# Patient Record
Sex: FEMALE | Race: WHITE | NOT HISPANIC OR LATINO | Employment: OTHER | URBAN - METROPOLITAN AREA
[De-identification: names, ages, dates, MRNs, and addresses within clinical notes are randomized per-mention and may not be internally consistent; named-entity substitution may affect disease eponyms.]

---

## 2021-02-22 ENCOUNTER — TRANSCRIBE ORDERS (OUTPATIENT)
Dept: LAB | Facility: HOSPITAL | Age: 26
End: 2021-02-22

## 2021-02-22 ENCOUNTER — LAB (OUTPATIENT)
Dept: LAB | Facility: HOSPITAL | Age: 26
End: 2021-02-22

## 2021-02-22 DIAGNOSIS — Z34.81 PRENATAL CARE, SUBSEQUENT PREGNANCY, FIRST TRIMESTER: ICD-10-CM

## 2021-02-22 DIAGNOSIS — Z34.81 PRENATAL CARE, SUBSEQUENT PREGNANCY, FIRST TRIMESTER: Primary | ICD-10-CM

## 2021-02-22 LAB
25(OH)D3 SERPL-MCNC: 24.4 NG/ML (ref 30–100)
ABO GROUP BLD: NORMAL
BLD GP AB SCN SERPL QL: NEGATIVE
GLUCOSE SERPL-MCNC: 77 MG/DL (ref 65–99)
HBV SURFACE AG SERPL QL IA: NORMAL
HCV AB SER DONR QL: NORMAL
HIV1+2 AB SER QL: NORMAL
RH BLD: POSITIVE
TSH SERPL DL<=0.05 MIU/L-ACNC: 1.61 UIU/ML (ref 0.27–4.2)

## 2021-02-22 PROCEDURE — 80081 OBSTETRIC PANEL INC HIV TSTG: CPT

## 2021-02-22 PROCEDURE — 82306 VITAMIN D 25 HYDROXY: CPT

## 2021-02-22 PROCEDURE — 36415 COLL VENOUS BLD VENIPUNCTURE: CPT

## 2021-02-22 PROCEDURE — 86803 HEPATITIS C AB TEST: CPT

## 2021-02-22 PROCEDURE — 84443 ASSAY THYROID STIM HORMONE: CPT

## 2021-02-22 PROCEDURE — 82947 ASSAY GLUCOSE BLOOD QUANT: CPT

## 2021-02-23 LAB
BASOPHILS # BLD AUTO: 0.04 10*3/MM3 (ref 0–0.2)
BASOPHILS NFR BLD AUTO: 0.5 % (ref 0–1.5)
DEPRECATED RDW RBC AUTO: 42.8 FL (ref 37–54)
EOSINOPHIL # BLD AUTO: 0.06 10*3/MM3 (ref 0–0.4)
EOSINOPHIL NFR BLD AUTO: 0.7 % (ref 0.3–6.2)
ERYTHROCYTE [DISTWIDTH] IN BLOOD BY AUTOMATED COUNT: 13.7 % (ref 12.3–15.4)
HCT VFR BLD AUTO: 34.1 % (ref 34–46.6)
HGB BLD-MCNC: 11.2 G/DL (ref 12–15.9)
IMM GRANULOCYTES # BLD AUTO: 0.05 10*3/MM3 (ref 0–0.05)
IMM GRANULOCYTES NFR BLD AUTO: 0.6 % (ref 0–0.5)
LYMPHOCYTES # BLD AUTO: 1.62 10*3/MM3 (ref 0.7–3.1)
LYMPHOCYTES NFR BLD AUTO: 18.6 % (ref 19.6–45.3)
MCH RBC QN AUTO: 27.8 PG (ref 26.6–33)
MCHC RBC AUTO-ENTMCNC: 32.8 G/DL (ref 31.5–35.7)
MCV RBC AUTO: 84.6 FL (ref 79–97)
MONOCYTES # BLD AUTO: 0.59 10*3/MM3 (ref 0.1–0.9)
MONOCYTES NFR BLD AUTO: 6.8 % (ref 5–12)
NEUTROPHILS NFR BLD AUTO: 6.36 10*3/MM3 (ref 1.7–7)
NEUTROPHILS NFR BLD AUTO: 72.8 % (ref 42.7–76)
NRBC BLD AUTO-RTO: 0 /100 WBC (ref 0–0.2)
PLATELET # BLD AUTO: 223 10*3/MM3 (ref 140–450)
PMV BLD AUTO: 12.3 FL (ref 6–12)
RBC # BLD AUTO: 4.03 10*6/MM3 (ref 3.77–5.28)
RPR SER QL: NORMAL
RUBV IGG SERPL IA-ACNC: POSITIVE
WBC # BLD AUTO: 8.72 10*3/MM3 (ref 3.4–10.8)

## 2021-06-09 ENCOUNTER — TRANSCRIBE ORDERS (OUTPATIENT)
Dept: LAB | Facility: HOSPITAL | Age: 26
End: 2021-06-09

## 2021-06-09 ENCOUNTER — LAB (OUTPATIENT)
Dept: LAB | Facility: HOSPITAL | Age: 26
End: 2021-06-09

## 2021-06-09 DIAGNOSIS — Z34.03 ENCOUNTER FOR SUPERVISION OF NORMAL FIRST PREGNANCY IN THIRD TRIMESTER: ICD-10-CM

## 2021-06-09 DIAGNOSIS — Z34.03 ENCOUNTER FOR SUPERVISION OF NORMAL FIRST PREGNANCY IN THIRD TRIMESTER: Primary | ICD-10-CM

## 2021-06-09 LAB
25(OH)D3 SERPL-MCNC: 29 NG/ML
BASOPHILS # BLD AUTO: 0.04 10*3/MM3 (ref 0–0.2)
BASOPHILS NFR BLD AUTO: 0.4 % (ref 0–1.5)
DEPRECATED RDW RBC AUTO: 36.4 FL (ref 37–54)
EOSINOPHIL # BLD AUTO: 0.08 10*3/MM3 (ref 0–0.4)
EOSINOPHIL NFR BLD AUTO: 0.8 % (ref 0.3–6.2)
ERYTHROCYTE [DISTWIDTH] IN BLOOD BY AUTOMATED COUNT: 12.6 % (ref 12.3–15.4)
EXTERNAL GTT 1 HOUR: 108
GLUCOSE 1H P 100 G GLC PO SERPL-MCNC: 108 MG/DL (ref 65–140)
HCT VFR BLD AUTO: 26.9 % (ref 34–46.6)
HGB BLD-MCNC: 8.7 G/DL (ref 12–15.9)
IMM GRANULOCYTES # BLD AUTO: 0.16 10*3/MM3 (ref 0–0.05)
IMM GRANULOCYTES NFR BLD AUTO: 1.5 % (ref 0–0.5)
LYMPHOCYTES # BLD AUTO: 1.59 10*3/MM3 (ref 0.7–3.1)
LYMPHOCYTES NFR BLD AUTO: 15.2 % (ref 19.6–45.3)
MCH RBC QN AUTO: 26.1 PG (ref 26.6–33)
MCHC RBC AUTO-ENTMCNC: 32.3 G/DL (ref 31.5–35.7)
MCV RBC AUTO: 80.8 FL (ref 79–97)
MONOCYTES # BLD AUTO: 0.76 10*3/MM3 (ref 0.1–0.9)
MONOCYTES NFR BLD AUTO: 7.3 % (ref 5–12)
NEUTROPHILS NFR BLD AUTO: 7.8 10*3/MM3 (ref 1.7–7)
NEUTROPHILS NFR BLD AUTO: 74.8 % (ref 42.7–76)
NRBC BLD AUTO-RTO: 0 /100 WBC (ref 0–0.2)
PLATELET # BLD AUTO: 263 10*3/MM3 (ref 140–450)
PMV BLD AUTO: 11.3 FL (ref 6–12)
RBC # BLD AUTO: 3.33 10*6/MM3 (ref 3.77–5.28)
WBC # BLD AUTO: 10.43 10*3/MM3 (ref 3.4–10.8)

## 2021-06-09 PROCEDURE — 82962 GLUCOSE BLOOD TEST: CPT | Performed by: NURSE PRACTITIONER

## 2021-06-09 PROCEDURE — 82306 VITAMIN D 25 HYDROXY: CPT

## 2021-06-09 PROCEDURE — 85025 COMPLETE CBC W/AUTO DIFF WBC: CPT

## 2021-06-09 PROCEDURE — 36415 COLL VENOUS BLD VENIPUNCTURE: CPT

## 2021-06-09 PROCEDURE — 82950 GLUCOSE TEST: CPT

## 2021-07-07 ENCOUNTER — LAB (OUTPATIENT)
Dept: LAB | Facility: HOSPITAL | Age: 26
End: 2021-07-07

## 2021-07-07 ENCOUNTER — TRANSCRIBE ORDERS (OUTPATIENT)
Dept: LAB | Facility: HOSPITAL | Age: 26
End: 2021-07-07

## 2021-07-07 DIAGNOSIS — O99.011 ANEMIA COMPLICATING PREGNANCY IN FIRST TRIMESTER: ICD-10-CM

## 2021-07-07 DIAGNOSIS — Z34.03 ENCOUNTER FOR SUPERVISION OF NORMAL FIRST PREGNANCY IN THIRD TRIMESTER: Primary | ICD-10-CM

## 2021-07-07 DIAGNOSIS — Z34.03 ENCOUNTER FOR SUPERVISION OF NORMAL FIRST PREGNANCY IN THIRD TRIMESTER: ICD-10-CM

## 2021-07-07 LAB
DEPRECATED RDW RBC AUTO: 50.2 FL (ref 37–54)
ERYTHROCYTE [DISTWIDTH] IN BLOOD BY AUTOMATED COUNT: 16.4 % (ref 12.3–15.4)
FERRITIN SERPL-MCNC: 20.9 NG/ML (ref 13–150)
HCT VFR BLD AUTO: 33.6 % (ref 34–46.6)
HGB BLD-MCNC: 10.7 G/DL (ref 12–15.9)
IRON 24H UR-MRATE: 75 MCG/DL (ref 37–145)
IRON SATN MFR SERPL: 14 % (ref 20–50)
MCH RBC QN AUTO: 26.8 PG (ref 26.6–33)
MCHC RBC AUTO-ENTMCNC: 31.8 G/DL (ref 31.5–35.7)
MCV RBC AUTO: 84 FL (ref 79–97)
PLATELET # BLD AUTO: 221 10*3/MM3 (ref 140–450)
PMV BLD AUTO: 11.7 FL (ref 6–12)
RBC # BLD AUTO: 4 10*6/MM3 (ref 3.77–5.28)
TIBC SERPL-MCNC: 522 MCG/DL (ref 298–536)
TRANSFERRIN SERPL-MCNC: 350 MG/DL (ref 200–360)
WBC # BLD AUTO: 9.59 10*3/MM3 (ref 3.4–10.8)

## 2021-07-07 PROCEDURE — 84466 ASSAY OF TRANSFERRIN: CPT

## 2021-07-07 PROCEDURE — 36415 COLL VENOUS BLD VENIPUNCTURE: CPT

## 2021-07-07 PROCEDURE — 82728 ASSAY OF FERRITIN: CPT

## 2021-07-07 PROCEDURE — 83540 ASSAY OF IRON: CPT

## 2021-07-07 PROCEDURE — 85027 COMPLETE CBC AUTOMATED: CPT

## 2021-08-04 LAB — EXTERNAL GROUP B STREP ANTIGEN: NORMAL

## 2021-08-11 ENCOUNTER — HOSPITAL ENCOUNTER (INPATIENT)
Facility: HOSPITAL | Age: 26
LOS: 3 days | Discharge: HOME | End: 2021-08-14
Attending: OBSTETRICS & GYNECOLOGY | Admitting: OBSTETRICS & GYNECOLOGY

## 2021-08-11 PROBLEM — Z34.90 TERM PREGNANCY: Status: ACTIVE | Noted: 2021-08-11

## 2021-08-11 LAB
ABO GROUP BLD: NORMAL
ALP SERPL-CCNC: 156 U/L (ref 39–117)
ALT SERPL W P-5'-P-CCNC: 8 U/L (ref 1–33)
AST SERPL-CCNC: 13 U/L (ref 1–32)
BILIRUB SERPL-MCNC: <0.2 MG/DL (ref 0–1.2)
BLD GP AB SCN SERPL QL: NEGATIVE
CREAT SERPL-MCNC: 0.51 MG/DL (ref 0.57–1)
DEPRECATED RDW RBC AUTO: 56.4 FL (ref 37–54)
ERYTHROCYTE [DISTWIDTH] IN BLOOD BY AUTOMATED COUNT: 18.5 % (ref 12.3–15.4)
HCT VFR BLD AUTO: 36.4 % (ref 34–46.6)
HGB BLD-MCNC: 12.1 G/DL (ref 12–15.9)
LDH SERPL-CCNC: 136 U/L (ref 135–214)
MCH RBC QN AUTO: 27.9 PG (ref 26.6–33)
MCHC RBC AUTO-ENTMCNC: 33.2 G/DL (ref 31.5–35.7)
MCV RBC AUTO: 84.1 FL (ref 79–97)
PLATELET # BLD AUTO: 184 10*3/MM3 (ref 140–450)
PMV BLD AUTO: 11.6 FL (ref 6–12)
RBC # BLD AUTO: 4.33 10*6/MM3 (ref 3.77–5.28)
RH BLD: POSITIVE
SARS-COV-2 RDRP RESP QL NAA+PROBE: NORMAL
T&S EXPIRATION DATE: NORMAL
URATE SERPL-MCNC: 3.5 MG/DL (ref 2.4–5.7)
WBC # BLD AUTO: 10.21 10*3/MM3 (ref 3.4–10.8)

## 2021-08-11 PROCEDURE — 85027 COMPLETE CBC AUTOMATED: CPT | Performed by: OBSTETRICS & GYNECOLOGY

## 2021-08-11 PROCEDURE — 84450 TRANSFERASE (AST) (SGOT): CPT | Performed by: OBSTETRICS & GYNECOLOGY

## 2021-08-11 PROCEDURE — 86850 RBC ANTIBODY SCREEN: CPT | Performed by: OBSTETRICS & GYNECOLOGY

## 2021-08-11 PROCEDURE — 86901 BLOOD TYPING SEROLOGIC RH(D): CPT | Performed by: OBSTETRICS & GYNECOLOGY

## 2021-08-11 PROCEDURE — 82247 BILIRUBIN TOTAL: CPT | Performed by: OBSTETRICS & GYNECOLOGY

## 2021-08-11 PROCEDURE — 25010000002 BUTORPHANOL PER 1 MG: Performed by: OBSTETRICS & GYNECOLOGY

## 2021-08-11 PROCEDURE — 86900 BLOOD TYPING SEROLOGIC ABO: CPT | Performed by: OBSTETRICS & GYNECOLOGY

## 2021-08-11 PROCEDURE — 84550 ASSAY OF BLOOD/URIC ACID: CPT | Performed by: OBSTETRICS & GYNECOLOGY

## 2021-08-11 PROCEDURE — 82565 ASSAY OF CREATININE: CPT | Performed by: OBSTETRICS & GYNECOLOGY

## 2021-08-11 PROCEDURE — 59025 FETAL NON-STRESS TEST: CPT

## 2021-08-11 PROCEDURE — 83615 LACTATE (LD) (LDH) ENZYME: CPT | Performed by: OBSTETRICS & GYNECOLOGY

## 2021-08-11 PROCEDURE — 84460 ALANINE AMINO (ALT) (SGPT): CPT | Performed by: OBSTETRICS & GYNECOLOGY

## 2021-08-11 PROCEDURE — 3E033VJ INTRODUCTION OF OTHER HORMONE INTO PERIPHERAL VEIN, PERCUTANEOUS APPROACH: ICD-10-PCS | Performed by: OBSTETRICS & GYNECOLOGY

## 2021-08-11 PROCEDURE — 87635 SARS-COV-2 COVID-19 AMP PRB: CPT | Performed by: OBSTETRICS & GYNECOLOGY

## 2021-08-11 PROCEDURE — 59200 INSERT CERVICAL DILATOR: CPT | Performed by: OBSTETRICS & GYNECOLOGY

## 2021-08-11 PROCEDURE — 84075 ASSAY ALKALINE PHOSPHATASE: CPT | Performed by: OBSTETRICS & GYNECOLOGY

## 2021-08-11 RX ORDER — EPHEDRINE SULFATE 5 MG/ML
INJECTION INTRAVENOUS
Status: DISCONTINUED
Start: 2021-08-11 | End: 2021-08-12 | Stop reason: WASHOUT

## 2021-08-11 RX ORDER — BUTORPHANOL TARTRATE 1 MG/ML
1 INJECTION, SOLUTION INTRAMUSCULAR; INTRAVENOUS
Status: DISCONTINUED | OUTPATIENT
Start: 2021-08-11 | End: 2021-08-12 | Stop reason: HOSPADM

## 2021-08-11 RX ORDER — ERYTHROMYCIN 5 MG/G
OINTMENT OPHTHALMIC
Status: DISCONTINUED
Start: 2021-08-11 | End: 2021-08-11

## 2021-08-11 RX ORDER — SODIUM CHLORIDE 0.9 % (FLUSH) 0.9 %
3 SYRINGE (ML) INJECTION EVERY 12 HOURS SCHEDULED
Status: DISCONTINUED | OUTPATIENT
Start: 2021-08-11 | End: 2021-08-12 | Stop reason: HOSPADM

## 2021-08-11 RX ORDER — ONDANSETRON 2 MG/ML
4 INJECTION INTRAMUSCULAR; INTRAVENOUS EVERY 6 HOURS PRN
Status: DISCONTINUED | OUTPATIENT
Start: 2021-08-11 | End: 2021-08-12 | Stop reason: HOSPADM

## 2021-08-11 RX ORDER — MAGNESIUM CARB/ALUMINUM HYDROX 105-160MG
30 TABLET,CHEWABLE ORAL ONCE
Status: DISCONTINUED | OUTPATIENT
Start: 2021-08-11 | End: 2021-08-12 | Stop reason: HOSPADM

## 2021-08-11 RX ORDER — OXYTOCIN-SODIUM CHLORIDE 0.9% IV SOLN 30 UNIT/500ML 30-0.9/5 UT/ML-%
2-20 SOLUTION INTRAVENOUS
Status: DISCONTINUED | OUTPATIENT
Start: 2021-08-12 | End: 2021-08-12

## 2021-08-11 RX ORDER — EPINEPHRINE 0.3 MG/.3ML
INJECTION SUBCUTANEOUS
Status: ON HOLD | COMMUNITY
End: 2021-08-12

## 2021-08-11 RX ORDER — PRENATAL WITH FERROUS FUM AND FOLIC ACID 3080; 920; 120; 400; 22; 1.84; 3; 20; 10; 1; 12; 200; 27; 25; 2 [IU]/1; [IU]/1; MG/1; [IU]/1; MG/1; MG/1; MG/1; MG/1; MG/1; MG/1; UG/1; MG/1; MG/1; MG/1; MG/1
1 TABLET ORAL DAILY
COMMUNITY

## 2021-08-11 RX ORDER — LIDOCAINE HYDROCHLORIDE 10 MG/ML
5 INJECTION, SOLUTION EPIDURAL; INFILTRATION; INTRACAUDAL; PERINEURAL AS NEEDED
Status: DISCONTINUED | OUTPATIENT
Start: 2021-08-11 | End: 2021-08-12 | Stop reason: HOSPADM

## 2021-08-11 RX ORDER — FERROUS SULFATE TAB EC 324 MG (65 MG FE EQUIVALENT) 324 (65 FE) MG
324 TABLET DELAYED RESPONSE ORAL
COMMUNITY

## 2021-08-11 RX ORDER — TERBUTALINE SULFATE 1 MG/ML
0.25 INJECTION, SOLUTION SUBCUTANEOUS AS NEEDED
Status: DISCONTINUED | OUTPATIENT
Start: 2021-08-11 | End: 2021-08-12 | Stop reason: HOSPADM

## 2021-08-11 RX ORDER — SODIUM CHLORIDE, SODIUM LACTATE, POTASSIUM CHLORIDE, CALCIUM CHLORIDE 600; 310; 30; 20 MG/100ML; MG/100ML; MG/100ML; MG/100ML
125 INJECTION, SOLUTION INTRAVENOUS CONTINUOUS
Status: DISCONTINUED | OUTPATIENT
Start: 2021-08-11 | End: 2021-08-12

## 2021-08-11 RX ORDER — SODIUM CHLORIDE 0.9 % (FLUSH) 0.9 %
3-10 SYRINGE (ML) INJECTION AS NEEDED
Status: DISCONTINUED | OUTPATIENT
Start: 2021-08-11 | End: 2021-08-12 | Stop reason: HOSPADM

## 2021-08-11 RX ORDER — ONDANSETRON 4 MG/1
4 TABLET, FILM COATED ORAL EVERY 6 HOURS PRN
Status: DISCONTINUED | OUTPATIENT
Start: 2021-08-11 | End: 2021-08-12 | Stop reason: HOSPADM

## 2021-08-11 RX ORDER — CLINDAMYCIN PHOSPHATE 900 MG/50ML
900 INJECTION INTRAVENOUS EVERY 8 HOURS
Status: DISCONTINUED | OUTPATIENT
Start: 2021-08-11 | End: 2021-08-12

## 2021-08-11 RX ADMIN — BUTORPHANOL TARTRATE 2 MG: 2 INJECTION, SOLUTION INTRAMUSCULAR; INTRAVENOUS at 21:38

## 2021-08-11 RX ADMIN — SODIUM CHLORIDE, POTASSIUM CHLORIDE, SODIUM LACTATE AND CALCIUM CHLORIDE 125 ML/HR: 600; 310; 30; 20 INJECTION, SOLUTION INTRAVENOUS at 19:20

## 2021-08-11 RX ADMIN — BUTORPHANOL TARTRATE 2 MG: 2 INJECTION, SOLUTION INTRAMUSCULAR; INTRAVENOUS at 23:58

## 2021-08-11 NOTE — PROCEDURES
"Patient admitted for induction of labor at 37 weeks 5 days gestation secondary to gestational hypertension.  Received request for placement of transcervical Colmenares bulb for cervical ripening.  Cervix 1 cm / 60%/-3 station (cervix mid position and medium texture).  Cephalic presentation confirmed by bedside ultrasound.  Transcervical Colmenares placed per physician request.  FHT's class 1.  Patient tolerated well.  24 [place], 60ml.    Sameer Pond \"[place]\" RJ Edmonds MD  8/11/2021  17:11 EDT        "

## 2021-08-11 NOTE — H&P
[facility]  Obstetric History and Physical    Chief Complaint   Patient presents with   • Scheduled Induction       Subjective     Patient is a 26 y.o. female  currently at 37w5d, who presents for induction of labor secondary to BPP 6/10, polyhydramnios, LGA and bp 135/90 in the office today. She denies headache, visual change, epigastric pain.     The following portions of the patients history were reviewed and updated as appropriate: current medications, allergies, past medical history, past surgical history, past family history, past social history and problem list .       Prenatal Information:   Maternal Prenatal Labs  Blood Type No results found for: ABO   Rh Status No results found for: RH   Antibody Screen No results found for: ABSCRN   Gonnorhea No results found for: GCCX   Chlamydia No results found for: CLAMYDCU   RPR No results found for: RPR   Syphilis Antibody No results found for: SYPHILIS   Rubella No results found for: RUBELLAIGGIN   Hepatitis B Surface Antigen No results found for: HEPBSAG   HIV-1 Antibody No results found for: LABHIV1   Hepatitis C Antibody No results found for: HEPCAB   Rapid Urin Drug Screen No results found for: AMPMETHU, BARBITSCNUR, LABBENZSCN, LABMETHSCN, LABOPIASCN, THCURSCR, COCAINEUR, AMPHETSCREEN, PROPOXSCN, BUPRENORSCNU, METAMPSCNUR, OXYCODONESCN, TRICYCLICSCN   Group B Strep Culture No results found for: GBSANTIGEN                 Past OB History:       OB History    Para Term  AB Living   1 0 0 0 0 0   SAB TAB Ectopic Molar Multiple Live Births   0 0 0 0 0 0      # Outcome Date GA Lbr Amanuel/2nd Weight Sex Delivery Anes PTL Lv   1 Current                Past Medical History: Past Medical History:   Diagnosis Date   • Anemia     during pregnancy   • Scoliosis    • Urinary tract infection       Past Surgical History No past surgical history on file.   Family History: No family history on file.   Social History:  reports that she has never smoked. She  does not have any smokeless tobacco history on file.   reports previous alcohol use.   reports no history of drug use.        REVIEW OF SYSTEMS              Reports fetal movement is normal             Denies leakage of amniotic fluid.             Denies vaginal bleeding             She reports No contractions             All other systems reviewed and are negative    Objective       Vital Signs Range for the last 24 hours  Temperature: Temp:  [98.2 °F (36.8 °C)] 98.2 °F (36.8 °C)   Temp Source: Temp src: Oral   BP: BP: (122-132)/(78-79) 122/79   Pulse: Heart Rate:  [113-121] 113   Respirations: Resp:  [16] 16   SPO2:     O2 Amount (l/min):     O2 Devices     Weight: Weight:  [91.2 kg (201 lb)] 91.2 kg (201 lb)       Presentation: cephalic   Cervix: Exam by:     Dilation:     Effacement:     Station:       Fetal Heart Rate Assessment   Method:     Beats/min:     Baseline:     Variability:     Accels:     Decels:     Tracing Category:       NST:  NON-REACTIVE    Constitutional:  Well developed, well nourished, no acute distress, well-groomed.   Respiratory:  Lungs are clear to auscultation bilaterally, normal breath sounds.   Cardiovascular:  Normal rate and rhythm, no murmurs.   Gastrointestinal:  Soft, gravid, nontender.  Uterus: Soft, nontender. Fundus appropriate for dates.  Neurologic:  Alert & oriented x 3,  no focal deficits noted.   Psychiatric:  Speech and behavior appropriate.   Extremities: no cyanosis, clubbing or edema, no evidence of DVT.        Labs:  Lab Results   Component Value Date    WBC 10.21 08/11/2021    HGB 12.1 08/11/2021    HCT 36.4 08/11/2021    MCV 84.1 08/11/2021     08/11/2021    URICACID 3.5 08/11/2021    AST 13 08/11/2021    ALT 8 08/11/2021     08/11/2021               Assessment/Plan       Term pregnancy        Assessment:  1.  Intrauterine pregnancy at 37w5d weeks gestation  2.  Polyhydramnios  3.  LGA  4.  Gestational hypertension  Plan:  1. Admit for cervical  ripening and IOL>       Nadira Power MD  8/11/2021  16:14 EDT

## 2021-08-12 ENCOUNTER — ANESTHESIA (OUTPATIENT)
Dept: LABOR AND DELIVERY | Facility: HOSPITAL | Age: 26
End: 2021-08-12

## 2021-08-12 ENCOUNTER — ANESTHESIA EVENT (OUTPATIENT)
Dept: LABOR AND DELIVERY | Facility: HOSPITAL | Age: 26
End: 2021-08-12

## 2021-08-12 PROBLEM — Z34.90 TERM PREGNANCY: Status: RESOLVED | Noted: 2021-08-11 | Resolved: 2021-08-12

## 2021-08-12 LAB
AMPHET+METHAMPHET UR QL: NEGATIVE
AMPHETAMINES UR QL: NEGATIVE
BARBITURATES UR QL SCN: NEGATIVE
BENZODIAZ UR QL SCN: NEGATIVE
BUPRENORPHINE SERPL-MCNC: NEGATIVE NG/ML
CANNABINOIDS SERPL QL: NEGATIVE
COCAINE UR QL: NEGATIVE
DEPRECATED RDW RBC AUTO: 58.5 FL (ref 37–54)
ERYTHROCYTE [DISTWIDTH] IN BLOOD BY AUTOMATED COUNT: 18.6 % (ref 12.3–15.4)
HCT VFR BLD AUTO: 31.5 % (ref 34–46.6)
HGB BLD-MCNC: 10 G/DL (ref 12–15.9)
MCH RBC QN AUTO: 27.5 PG (ref 26.6–33)
MCHC RBC AUTO-ENTMCNC: 31.7 G/DL (ref 31.5–35.7)
MCV RBC AUTO: 86.5 FL (ref 79–97)
METHADONE UR QL SCN: NEGATIVE
OPIATES UR QL: NEGATIVE
OXYCODONE UR QL SCN: NEGATIVE
PCP UR QL SCN: NEGATIVE
PLATELET # BLD AUTO: 190 10*3/MM3 (ref 140–450)
PMV BLD AUTO: 11.7 FL (ref 6–12)
PROPOXYPH UR QL: NEGATIVE
RBC # BLD AUTO: 3.64 10*6/MM3 (ref 3.77–5.28)
TRICYCLICS UR QL SCN: NEGATIVE
WBC # BLD AUTO: 19.64 10*3/MM3 (ref 3.4–10.8)

## 2021-08-12 PROCEDURE — 25010000002 BUTORPHANOL PER 1 MG: Performed by: OBSTETRICS & GYNECOLOGY

## 2021-08-12 PROCEDURE — 25010000002 METHYLERGONOVINE MALEATE PER 0.2 MG: Performed by: OBSTETRICS & GYNECOLOGY

## 2021-08-12 PROCEDURE — 85027 COMPLETE CBC AUTOMATED: CPT | Performed by: NURSE PRACTITIONER

## 2021-08-12 PROCEDURE — C1755 CATHETER, INTRASPINAL: HCPCS | Performed by: ANESTHESIOLOGY

## 2021-08-12 PROCEDURE — C1755 CATHETER, INTRASPINAL: HCPCS

## 2021-08-12 PROCEDURE — 80306 DRUG TEST PRSMV INSTRMNT: CPT | Performed by: NURSE PRACTITIONER

## 2021-08-12 PROCEDURE — 25010000002 ROPIVACAINE PER 1 MG: Performed by: NURSE ANESTHETIST, CERTIFIED REGISTERED

## 2021-08-12 PROCEDURE — 25010000002 FENTANYL CITRATE (PF) 50 MCG/ML SOLUTION: Performed by: ANESTHESIOLOGY

## 2021-08-12 PROCEDURE — 25010000002 CEFAZOLIN PER 500 MG: Performed by: OBSTETRICS & GYNECOLOGY

## 2021-08-12 PROCEDURE — 51703 INSERT BLADDER CATH COMPLEX: CPT

## 2021-08-12 PROCEDURE — 25010000002 CHLOROPROCAINE HCL (PF) 3 % SOLUTION: Performed by: NURSE ANESTHETIST, CERTIFIED REGISTERED

## 2021-08-12 PROCEDURE — 0KQM0ZZ REPAIR PERINEUM MUSCLE, OPEN APPROACH: ICD-10-PCS | Performed by: NURSE PRACTITIONER

## 2021-08-12 PROCEDURE — 25010000002 MORPHINE PER 10 MG: Performed by: NURSE ANESTHETIST, CERTIFIED REGISTERED

## 2021-08-12 RX ORDER — TRISODIUM CITRATE DIHYDRATE AND CITRIC ACID MONOHYDRATE 500; 334 MG/5ML; MG/5ML
30 SOLUTION ORAL ONCE
Status: DISCONTINUED | OUTPATIENT
Start: 2021-08-12 | End: 2021-08-12 | Stop reason: HOSPADM

## 2021-08-12 RX ORDER — BISACODYL 10 MG
10 SUPPOSITORY, RECTAL RECTAL DAILY PRN
Status: DISCONTINUED | OUTPATIENT
Start: 2021-08-13 | End: 2021-08-14 | Stop reason: HOSPADM

## 2021-08-12 RX ORDER — MISOPROSTOL 200 UG/1
800 TABLET ORAL AS NEEDED
Status: DISCONTINUED | OUTPATIENT
Start: 2021-08-12 | End: 2021-08-12 | Stop reason: HOSPADM

## 2021-08-12 RX ORDER — EPHEDRINE SULFATE 5 MG/ML
10 INJECTION INTRAVENOUS
Status: DISCONTINUED | OUTPATIENT
Start: 2021-08-12 | End: 2021-08-12 | Stop reason: HOSPADM

## 2021-08-12 RX ORDER — OXYTOCIN-SODIUM CHLORIDE 0.9% IV SOLN 30 UNIT/500ML 30-0.9/5 UT/ML-%
85 SOLUTION INTRAVENOUS ONCE
Status: COMPLETED | OUTPATIENT
Start: 2021-08-12 | End: 2021-08-12

## 2021-08-12 RX ORDER — MORPHINE SULFATE 10 MG/ML
INJECTION INTRAMUSCULAR; INTRAVENOUS; SUBCUTANEOUS
Status: DISCONTINUED
Start: 2021-08-12 | End: 2021-08-12 | Stop reason: WASHOUT

## 2021-08-12 RX ORDER — BUPIVACAINE HYDROCHLORIDE 2.5 MG/ML
INJECTION, SOLUTION EPIDURAL; INFILTRATION; INTRACAUDAL AS NEEDED
Status: DISCONTINUED | OUTPATIENT
Start: 2021-08-12 | End: 2021-08-12 | Stop reason: SURG

## 2021-08-12 RX ORDER — CEFAZOLIN SODIUM 2 G/100ML
INJECTION, SOLUTION INTRAVENOUS
Status: DISCONTINUED
Start: 2021-08-12 | End: 2021-08-14 | Stop reason: HOSPADM

## 2021-08-12 RX ORDER — CEFAZOLIN SODIUM 2 G/100ML
2 INJECTION, SOLUTION INTRAVENOUS ONCE
Status: COMPLETED | OUTPATIENT
Start: 2021-08-12 | End: 2021-08-12

## 2021-08-12 RX ORDER — METHYLERGONOVINE MALEATE 0.2 MG/ML
200 INJECTION INTRAVENOUS ONCE AS NEEDED
Status: COMPLETED | OUTPATIENT
Start: 2021-08-12 | End: 2021-08-12

## 2021-08-12 RX ORDER — CARBOPROST TROMETHAMINE 250 UG/ML
250 INJECTION, SOLUTION INTRAMUSCULAR AS NEEDED
Status: DISCONTINUED | OUTPATIENT
Start: 2021-08-12 | End: 2021-08-12 | Stop reason: HOSPADM

## 2021-08-12 RX ORDER — FAMOTIDINE 10 MG/ML
20 INJECTION, SOLUTION INTRAVENOUS ONCE AS NEEDED
Status: DISCONTINUED | OUTPATIENT
Start: 2021-08-12 | End: 2021-08-12 | Stop reason: HOSPADM

## 2021-08-12 RX ORDER — ONDANSETRON 2 MG/ML
4 INJECTION INTRAMUSCULAR; INTRAVENOUS EVERY 6 HOURS PRN
Status: DISCONTINUED | OUTPATIENT
Start: 2021-08-12 | End: 2021-08-14 | Stop reason: HOSPADM

## 2021-08-12 RX ORDER — METHYLERGONOVINE MALEATE 0.2 MG/ML
INJECTION INTRAVENOUS
Status: DISCONTINUED
Start: 2021-08-12 | End: 2021-08-14 | Stop reason: HOSPADM

## 2021-08-12 RX ORDER — OXYTOCIN-SODIUM CHLORIDE 0.9% IV SOLN 30 UNIT/500ML 30-0.9/5 UT/ML-%
650 SOLUTION INTRAVENOUS ONCE
Status: COMPLETED | OUTPATIENT
Start: 2021-08-12 | End: 2021-08-12

## 2021-08-12 RX ORDER — OXYCODONE AND ACETAMINOPHEN 10; 325 MG/1; MG/1
2 TABLET ORAL EVERY 4 HOURS PRN
Status: DISCONTINUED | OUTPATIENT
Start: 2021-08-12 | End: 2021-08-12 | Stop reason: HOSPADM

## 2021-08-12 RX ORDER — FENTANYL CITRATE 50 UG/ML
INJECTION, SOLUTION INTRAMUSCULAR; INTRAVENOUS AS NEEDED
Status: DISCONTINUED | OUTPATIENT
Start: 2021-08-12 | End: 2021-08-12 | Stop reason: SURG

## 2021-08-12 RX ORDER — ONDANSETRON 2 MG/ML
4 INJECTION INTRAMUSCULAR; INTRAVENOUS ONCE AS NEEDED
Status: DISCONTINUED | OUTPATIENT
Start: 2021-08-12 | End: 2021-08-12 | Stop reason: HOSPADM

## 2021-08-12 RX ORDER — DIPHENHYDRAMINE HYDROCHLORIDE 50 MG/ML
12.5 INJECTION INTRAMUSCULAR; INTRAVENOUS EVERY 8 HOURS PRN
Status: DISCONTINUED | OUTPATIENT
Start: 2021-08-12 | End: 2021-08-12 | Stop reason: HOSPADM

## 2021-08-12 RX ORDER — HYDROCODONE BITARTRATE AND ACETAMINOPHEN 5; 325 MG/1; MG/1
1 TABLET ORAL EVERY 4 HOURS PRN
Status: DISCONTINUED | OUTPATIENT
Start: 2021-08-12 | End: 2021-08-14 | Stop reason: HOSPADM

## 2021-08-12 RX ORDER — LANOLIN
CREAM (ML) TOPICAL
Status: DISCONTINUED | OUTPATIENT
Start: 2021-08-12 | End: 2021-08-14 | Stop reason: HOSPADM

## 2021-08-12 RX ORDER — DOCUSATE SODIUM 100 MG/1
100 CAPSULE, LIQUID FILLED ORAL 2 TIMES DAILY
Status: DISCONTINUED | OUTPATIENT
Start: 2021-08-12 | End: 2021-08-14 | Stop reason: HOSPADM

## 2021-08-12 RX ORDER — MORPHINE SULFATE 1 MG/ML
INJECTION, SOLUTION EPIDURAL; INTRATHECAL; INTRAVENOUS AS NEEDED
Status: DISCONTINUED | OUTPATIENT
Start: 2021-08-12 | End: 2021-08-12 | Stop reason: SURG

## 2021-08-12 RX ORDER — LIDOCAINE HYDROCHLORIDE AND EPINEPHRINE 15; 5 MG/ML; UG/ML
INJECTION, SOLUTION EPIDURAL AS NEEDED
Status: DISCONTINUED | OUTPATIENT
Start: 2021-08-12 | End: 2021-08-12 | Stop reason: SURG

## 2021-08-12 RX ORDER — SODIUM CHLORIDE 0.9 % (FLUSH) 0.9 %
1-10 SYRINGE (ML) INJECTION AS NEEDED
Status: DISCONTINUED | OUTPATIENT
Start: 2021-08-12 | End: 2021-08-14 | Stop reason: HOSPADM

## 2021-08-12 RX ORDER — ONDANSETRON 4 MG/1
4 TABLET, FILM COATED ORAL EVERY 6 HOURS PRN
Status: DISCONTINUED | OUTPATIENT
Start: 2021-08-12 | End: 2021-08-12 | Stop reason: HOSPADM

## 2021-08-12 RX ORDER — ACETAMINOPHEN 325 MG/1
650 TABLET ORAL EVERY 4 HOURS PRN
Status: DISCONTINUED | OUTPATIENT
Start: 2021-08-12 | End: 2021-08-12 | Stop reason: HOSPADM

## 2021-08-12 RX ORDER — ONDANSETRON 2 MG/ML
4 INJECTION INTRAMUSCULAR; INTRAVENOUS EVERY 6 HOURS PRN
Status: DISCONTINUED | OUTPATIENT
Start: 2021-08-12 | End: 2021-08-12 | Stop reason: HOSPADM

## 2021-08-12 RX ORDER — OXYCODONE HYDROCHLORIDE AND ACETAMINOPHEN 5; 325 MG/1; MG/1
1 TABLET ORAL EVERY 4 HOURS PRN
Status: DISCONTINUED | OUTPATIENT
Start: 2021-08-12 | End: 2021-08-12 | Stop reason: HOSPADM

## 2021-08-12 RX ORDER — CHLOROPROCAINE HYDROCHLORIDE 30 MG/ML
INJECTION, SOLUTION EPIDURAL; INFILTRATION; INTRACAUDAL; PERINEURAL AS NEEDED
Status: DISCONTINUED | OUTPATIENT
Start: 2021-08-12 | End: 2021-08-12 | Stop reason: SURG

## 2021-08-12 RX ORDER — METOCLOPRAMIDE HYDROCHLORIDE 5 MG/ML
10 INJECTION INTRAMUSCULAR; INTRAVENOUS ONCE AS NEEDED
Status: DISCONTINUED | OUTPATIENT
Start: 2021-08-12 | End: 2021-08-12 | Stop reason: HOSPADM

## 2021-08-12 RX ORDER — IBUPROFEN 600 MG/1
600 TABLET ORAL EVERY 6 HOURS PRN
Status: DISCONTINUED | OUTPATIENT
Start: 2021-08-12 | End: 2021-08-12 | Stop reason: HOSPADM

## 2021-08-12 RX ORDER — IBUPROFEN 600 MG/1
600 TABLET ORAL EVERY 6 HOURS PRN
Status: DISCONTINUED | OUTPATIENT
Start: 2021-08-12 | End: 2021-08-14 | Stop reason: HOSPADM

## 2021-08-12 RX ORDER — ROPIVACAINE HYDROCHLORIDE 5 MG/ML
INJECTION, SOLUTION EPIDURAL; INFILTRATION; PERINEURAL AS NEEDED
Status: DISCONTINUED | OUTPATIENT
Start: 2021-08-12 | End: 2021-08-12 | Stop reason: SURG

## 2021-08-12 RX ORDER — HYDROCORTISONE 25 MG/G
1 CREAM TOPICAL AS NEEDED
Status: DISCONTINUED | OUTPATIENT
Start: 2021-08-12 | End: 2021-08-14 | Stop reason: HOSPADM

## 2021-08-12 RX ADMIN — OXYTOCIN 85 ML/HR: 10 INJECTION INTRAVENOUS at 10:38

## 2021-08-12 RX ADMIN — BUTORPHANOL TARTRATE 2 MG: 2 INJECTION, SOLUTION INTRAMUSCULAR; INTRAVENOUS at 04:04

## 2021-08-12 RX ADMIN — METHYLERGONOVINE MALEATE 200 MCG: 0.2 INJECTION, SOLUTION INTRAMUSCULAR; INTRAVENOUS at 10:06

## 2021-08-12 RX ADMIN — Medication 16 ML/HR: at 06:22

## 2021-08-12 RX ADMIN — CHLOROPROCAINE HYDROCHLORIDE 10 MG: 30 INJECTION, SOLUTION EPIDURAL; INFILTRATION; INTRACAUDAL; PERINEURAL at 10:59

## 2021-08-12 RX ADMIN — ROPIVACAINE HYDROCHLORIDE 10 ML: 5 INJECTION, SOLUTION EPIDURAL; INFILTRATION; PERINEURAL at 07:28

## 2021-08-12 RX ADMIN — SODIUM CHLORIDE, POTASSIUM CHLORIDE, SODIUM LACTATE AND CALCIUM CHLORIDE 125 ML/HR: 600; 310; 30; 20 INJECTION, SOLUTION INTRAVENOUS at 06:26

## 2021-08-12 RX ADMIN — Medication: at 15:36

## 2021-08-12 RX ADMIN — SODIUM CHLORIDE, POTASSIUM CHLORIDE, SODIUM LACTATE AND CALCIUM CHLORIDE 125 ML/HR: 600; 310; 30; 20 INJECTION, SOLUTION INTRAVENOUS at 08:01

## 2021-08-12 RX ADMIN — SODIUM CHLORIDE, POTASSIUM CHLORIDE, SODIUM LACTATE AND CALCIUM CHLORIDE 125 ML/HR: 600; 310; 30; 20 INJECTION, SOLUTION INTRAVENOUS at 02:14

## 2021-08-12 RX ADMIN — BUPIVACAINE HYDROCHLORIDE 12 ML: 2.5 INJECTION, SOLUTION EPIDURAL; INFILTRATION; INTRACAUDAL; PERINEURAL at 06:18

## 2021-08-12 RX ADMIN — OXYTOCIN 2 MILLI-UNITS/MIN: 10 INJECTION INTRAVENOUS at 05:00

## 2021-08-12 RX ADMIN — DOCUSATE SODIUM 100 MG: 100 CAPSULE, LIQUID FILLED ORAL at 21:06

## 2021-08-12 RX ADMIN — MORPHINE SULFATE 3 MG: 1 INJECTION, SOLUTION EPIDURAL; INTRATHECAL; INTRAVENOUS at 11:08

## 2021-08-12 RX ADMIN — LIDOCAINE HYDROCHLORIDE AND EPINEPHRINE 2 ML: 15; 5 INJECTION, SOLUTION EPIDURAL at 06:17

## 2021-08-12 RX ADMIN — WITCH HAZEL 1 PAD: 500 SOLUTION RECTAL; TOPICAL at 15:36

## 2021-08-12 RX ADMIN — CEFAZOLIN 2 G: 10 INJECTION, POWDER, FOR SOLUTION INTRAVENOUS at 11:08

## 2021-08-12 RX ADMIN — IBUPROFEN 600 MG: 600 TABLET, FILM COATED ORAL at 21:06

## 2021-08-12 RX ADMIN — OXYTOCIN 650 ML/HR: 10 INJECTION INTRAVENOUS at 10:02

## 2021-08-12 RX ADMIN — LIDOCAINE HYDROCHLORIDE AND EPINEPHRINE 3 ML: 15; 5 INJECTION, SOLUTION EPIDURAL at 06:16

## 2021-08-12 RX ADMIN — SODIUM CHLORIDE, POTASSIUM CHLORIDE, SODIUM LACTATE AND CALCIUM CHLORIDE 1000 ML: 600; 310; 30; 20 INJECTION, SOLUTION INTRAVENOUS at 05:55

## 2021-08-12 RX ADMIN — FENTANYL CITRATE 100 MCG: 50 INJECTION, SOLUTION INTRAMUSCULAR; INTRAVENOUS at 06:18

## 2021-08-12 RX ADMIN — IBUPROFEN 600 MG: 600 TABLET ORAL at 14:01

## 2021-08-12 NOTE — ANESTHESIA PREPROCEDURE EVALUATION
Anesthesia Evaluation     Patient summary reviewed and Nursing notes reviewed   NPO Solid Status: > 6 hours  NPO Liquid Status: < 2 hours           Airway   Mallampati: II  TM distance: >3 FB  Neck ROM: full  No difficulty expected  Dental      Pulmonary - negative pulmonary ROS   Cardiovascular - negative cardio ROS        Neuro/Psych- negative ROS  GI/Hepatic/Renal/Endo - negative ROS     Musculoskeletal (-) negative ROS    Abdominal    Substance History - negative use     OB/GYN    (+) Pregnant,         Other          Other Comment: Scoliosis                  Anesthesia Plan    ASA 2     epidural       Anesthetic plan, all risks, benefits, and alternatives have been provided, discussed and informed consent has been obtained with: patient.  Use of blood products discussed with patient .

## 2021-08-12 NOTE — ANESTHESIA PROCEDURE NOTES
Labor Epidural      Patient reassessed immediately prior to procedure    Patient location during procedure: OB  Performed By  Anesthesiologist: Mason Camacho DO  Preanesthetic Checklist  Completed: patient identified, IV checked, site marked, risks and benefits discussed, surgical consent, monitors and equipment checked, pre-op evaluation and timeout performed  Prep:  Pt Position:sitting  Sterile Tech:gloves, mask, sterile barrier and cap  Prep:chlorhexidine gluconate and isopropyl alcohol  Monitoring:blood pressure monitoring and continuous pulse oximetry  Epidural Block Procedure:  Approach:midline  Guidance:landmark technique and palpation technique  Location:L2-L3  Needle Type:Tuohy  Needle Gauge:17 G  Loss of Resistance Medium: air  Loss of Resistance: 4cm  Cath Depth at skin:9 cm  Paresthesia: none  Aspiration:negative  Test Dose:negative  Number of Attempts: 1  Post Assessment:  Dressing:secured with tape and occlusive dressing applied (Tegaderm Placed)  Pt Tolerance:patient tolerated the procedure well with no apparent complications  Complications:no

## 2021-08-12 NOTE — PROGRESS NOTES
[facility]  Obstetric Progress Note  8/12/21 @0900    Subjective     Patient:    Resting without complaints    Objective     Vital Signs Range for the last 24 hours  Temp:  [97.7 °F (36.5 °C)-98.5 °F (36.9 °C)] 98.5 °F (36.9 °C)   Temp src: Oral   BP: (105-177)/(52-92) 121/56   Heart Rate:  [] 139   Resp:  [16-20] 16   SpO2:  [97 %-99 %] 98 %           Weight:  [91.2 kg (201 lb)] 91.2 kg (201 lb)       Intake/Output this shift:    I/O this shift:  In: -   Out: 890 [Urine:300; Blood:590]    Physical Exam:      Abdomen Abdominal exam: soft, nontender, nondistended, no masses or organomegaly.   Extremities Exam of extremities: peripheral pulses normal, no pedal edema, no clubbing or cyanosis     Presentation: vertex   Cervix: Exam by: Method: sterile exam per CNM   Dilation:  10   Effacement: Cervical Effacement: 100%   Station:  +2         Fetal Heart Rate Assessment   Method: Fetal HR Assessment Method: external (pt pushing)   Beats/min: Fetal HR (beats/min): 145   Baseline: Fetal Heart Baseline Rate: normal range   Varibility: Fetal HR Variability: moderate (amplitude range 6 to 25 bpm)   Accels: Fetal HR Accelerations: absent   Decels: Fetal HR Decelerations: absent   Tracing Category:       Uterine Assessment   Method: Method: external tocotransducer, palpation   Frequency (min): Contraction Frequency (Minutes): 2-3   Ctx Count in 10 min:     Duration:     Intensity: Contraction Intensity: strong by palpation   Intensity by IUPC:     Resting Tone: Uterine Resting Tone: soft by palpation   Resting Tone by IUPC:     [place] Units:         Assessment/Plan       Term pregnancy        Assessment:  1.  Intrauterine pregnancy at 37w6d weeks gestation with reactive fetal status.    2.  IOL for non-reassuring BPP  3.  Obstetrical history significant for is non-contributory.  4.  GBS status: No results found for: GBSANTIGEN    Plan:  1. Will start pushing with contractions  2.    Plan of care has been reviewed  with patient and she verbalizes understanding  3.  Risks, benefits of treatment plan have been discussed.  4.  All questions have been answered.        Jennifer Mitchell CNM  8/12/2021  12:44 EDT

## 2021-08-12 NOTE — PLAN OF CARE
Problem: Bleeding (Labor)  Goal: Hemostasis  Outcome: Met     Problem: Change in Fetal Wellbeing (Labor)  Goal: Stable Fetal Wellbeing  Outcome: Met  Intervention: Promote and Monitor Fetal Wellbeing  Recent Flowsheet Documentation  Taken 8/12/2021 0745 by Kacie Craig, RN  Body Position: side-lying, left     Problem: Delayed Labor Progression (Labor)  Goal: Effective Progression to Delivery  Outcome: Met     Problem: Infection (Labor)  Goal: Absence of Infection Signs and Symptoms  Outcome: Met     Problem: Labor Pain (Labor)  Goal: Acceptable Pain Control  Outcome: Met     Problem: Uterine Tachysystole (Labor)  Goal: Normal Uterine Contraction Pattern  Outcome: Met     Problem: Skin Injury Risk Increased  Goal: Skin Health and Integrity  Outcome: Met  Intervention: Optimize Skin Protection  Recent Flowsheet Documentation  Taken 8/12/2021 0745 by Kacie Craig, RN  Head of Bed (HOB): HOB elevated   Goal Outcome Evaluation:

## 2021-08-12 NOTE — L&D DELIVERY NOTE
[facility]  Vaginal Delivery Note    Delivery     Delivery: Vaginal, Spontaneous  Z3A.37   YOB: 2020    Time of Birth: 9:57 AM      Anesthesia: Epidural     Delivering clinician: Jennifer Mitchell    Forceps?   No   Vacuum? No    Shoulder dystocia present: No        Delivery narrative:  Patient pushed to deliver a male infant over a second degree laceration through a double nuchal cord. Spontaneous and vigorous cry. Infant was unwrapped from cord and placed on maternal abdomen where he was dried.  The cord was milked x4 then doubly clamped and cut. The placenta delivered spontaneously and visually intact.  Uterine atony was noted.  IV not infusing.  IM methergine administered. Uterine firmed with further massage. The perineum and vagina were inspected for lacerations.  Dr. Edmonds was consulted for assistance with repair.  A horizontal, JASMINA PUL was repaired using 3-0 vicryl.  A left sulcus laceration which extended horizontally to the right was repaired with 2-0 and 3-0 rapide. A second degree laceration was repaired using 2-0 and 3-0 rapide.  All counts were correct. Mom and baby entered recovery in stable condition.     Infant    Findings: male  infant, Arnoldo Masoud     Infant observations: Weight: 3650 g (8 lb 0.8 oz)   Length: 20.25  in  Observations/Comments:        Apgars: 8  @ 1 minute /    9  @ 5 minutes         Placenta, Cord, and Fluid    Placenta delivered  Spontaneous  at  8/12/2021 10:02 AM     Cord: 3 vessels  present.   Nuchal Cord?  yes; Number of nuchal loops present:  2    Cord blood obtained: Yes    Cord gases obtained:  No      Repair    Episiotomy: No   Lacerations: Yes  Laceration Information  Laceration Repaired?   Perineal: 2nd  Yes    Periurethral:   Yes    Labial:       Sulcus:   Yes    Vaginal:       Cervical:            Estimated Blood Loss:   400 mls.   Suture used for repair: 2-0 and 3-0 Vicryl         Complications  none    Disposition  Mother to Mother  Baby/Postpartum  in stable condition currently.  Baby to remains with mom  in stable condition currently.      Jennifer Mitchell CNM  08/12/21  12:52 EDT

## 2021-08-12 NOTE — LACTATION NOTE
Baby is very sleepy and uninterested in sucking at this time.  Mom and Dad are exhausted and need to nap.  She was encouraged to try breastfeeding again in a few hours, and to call for assistance, if needed.  She has a home Spectra pump with her.

## 2021-08-12 NOTE — PAYOR COMM NOTE
"Juan Flores (26 y.o. Female)     [place] Medicaid ID#[id number]    Delivery information.    From: Andria Watson  #[phone]  Fax#[phone]      Date of Birth Social Security Number Address Home Phone MRN    1995  [address] [place] [address] [place] [address] [phone] [id number]    [place] Marital Status          Latter-day        Admission Date Admission Type Admitting Provider Attending Provider Department, Room/Bed    21 Elective Nadira Power MD Fuson, Jennifer A, MD [facility] MOTHER BABY 4A, N420/1    Discharge Date Discharge Disposition Discharge Destination                       Attending Provider: Nadira Power MD    Allergies: Peanut-containing Drug Products    Isolation: None   Infection: None   Code Status: CPR    Ht: 165.1 cm (65\")   Wt: 91.2 kg (201 lb)    Admission Cmt: None   Principal Problem: None                Active Insurance as of 2021     Primary Coverage     Payor Plan Insurance Group Employer/Plan Group    ANTH MEDICAID [facility] MEDICAID [id number]     Payor Plan Address Payor Plan Phone Number Payor Plan Fax Number Effective Dates    [address] [address] [phone]  2021 - None Entered    [facility] [id number]       Subscriber Name Subscriber Birth Date Member ID       JUAN FLORES 1995 [id number]                 Emergency Contacts      (Rel.) Home Phone Work Phone Mobile Phone    KANWAL FLORES (Spouse) -- -- [phone]            Insurance Information                [employer] [employer]ICA[employer]/[place] [employer] Phone: [phone]    Subscriber: Juan Flores Subscriber#: [id number]    Group#: [id number] Precert#:              History & Physical      Nadira Power MD at [address] [place] [address]  Obstetric History and Physical    Chief Complaint   Patient presents with   • Scheduled Induction       Subjective     Patient is a 26 y.o. female  currently at 37w5d, who presents for " induction of labor secondary to BPP 6/10, polyhydramnios, LGA and bp 135/90 in the office today. She denies headache, visual change, epigastric pain.     The following portions of the patients history were reviewed and updated as appropriate: current medications, allergies, past medical history, past surgical history, past family history, past social history and problem list .       Prenatal Information:   Maternal Prenatal Labs  Blood Type No results found for: ABO   Rh Status No results found for: RH   Antibody Screen No results found for: ABSCRN   Gonnorhea No results found for: GCCX   Chlamydia No results found for: CLAMYDCU   RPR No results found for: RPR   Syphilis Antibody No results found for: SYPHILIS   Rubella No results found for: RUBELLAIGGIN   Hepatitis B Surface Antigen No results found for: HEPBSAG   HIV-1 Antibody No results found for: LABHIV1   Hepatitis C Antibody No results found for: HEPCAB   Rapid Urin Drug Screen No results found for: AMPMETHU, BARBITSCNUR, LABBENZSCN, LABMETHSCN, LABOPIASCN, THCURSCR, COCAINEUR, AMPHETSCREEN, PROPOXSCN, BUPRENORSCNU, METAMPSCNUR, OXYCODONESCN, TRICYCLICSCN   Group B Strep Culture No results found for: GBSANTIGEN                 Past OB History:       OB History    Para Term  AB Living   1 0 0 0 0 0   SAB TAB Ectopic Molar Multiple Live Births   0 0 0 0 0 0      # Outcome Date GA Lbr Amanuel/2nd Weight Sex Delivery Anes PTL Lv   1 Current                Past Medical History: Past Medical History:   Diagnosis Date   • Anemia     during pregnancy   • Scoliosis    • Urinary tract infection       Past Surgical History No past surgical history on file.   Family History: No family history on file.   Social History:  reports that she has never smoked. She does not have any smokeless tobacco history on file.   reports previous alcohol use.   reports no history of drug use.        REVIEW OF SYSTEMS              Reports fetal movement is normal              Denies leakage of amniotic fluid.             Denies vaginal bleeding             She reports No contractions             All other systems reviewed and are negative    Objective       Vital Signs Range for the last 24 hours  Temperature: Temp:  [98.2 °F (36.8 °C)] 98.2 °F (36.8 °C)   Temp Source: Temp src: Oral   BP: BP: (122-132)/(78-79) 122/79   Pulse: Heart Rate:  [113-121] 113   Respirations: Resp:  [16] 16   SPO2:     O2 Amount (l/min):     O2 Devices     Weight: Weight:  [91.2 kg (201 lb)] 91.2 kg (201 lb)       Presentation: cephalic   Cervix: Exam by:     Dilation:     Effacement:     Station:       Fetal Heart Rate Assessment   Method:     Beats/min:     Baseline:     Variability:     Accels:     Decels:     Tracing Category:       NST:  NON-REACTIVE    Constitutional:  Well developed, well nourished, no acute distress, well-groomed.   Respiratory:  Lungs are clear to auscultation bilaterally, normal breath sounds.   Cardiovascular:  Normal rate and rhythm, no murmurs.   Gastrointestinal:  Soft, gravid, nontender.  Uterus: Soft, nontender. Fundus appropriate for dates.  Neurologic:  Alert & oriented x 3,  no focal deficits noted.   Psychiatric:  Speech and behavior appropriate.   Extremities: no cyanosis, clubbing or edema, no evidence of DVT.        Labs:  Lab Results   Component Value Date    WBC 10.21 08/11/2021    HGB 12.1 08/11/2021    HCT 36.4 08/11/2021    MCV 84.1 08/11/2021     08/11/2021    URICACID 3.5 08/11/2021    AST 13 08/11/2021    ALT 8 08/11/2021     08/11/2021               Assessment/Plan       Term pregnancy        Assessment:  1.  Intrauterine pregnancy at 37w5d weeks gestation  2.  Polyhydramnios  3.  LGA  4.  Gestational hypertension  Plan:  1. Admit for cervical ripening and IOL>       Nadira Power MD  8/11/2021  16:14 EDT    Electronically signed by Nadira Power MD at 08/11/21 1617     H&P signed by [employer] Onbase, [place] at 08/11/21 4507      Scan on  8/11/2021 by [employer] On[employer], [place]: GBS result          Electronically signed by [employer] On[employer], [place] at 08/11/21 1731     H&P signed by [employer] On[employer], [place] at 08/11/21 1533      Scan on 8/11/2021 by [employer] On[employer], [place]: PRENATAL LWH 8/11/2021          Electronically signed by [employer] Onbase, [place] at 08/11/21 1533         Facility-Administered Medications as of 8/12/2021   Medication Dose Route Frequency Provider Last Rate Last Admin   • ceFAZolin in dextrose (ANCEF) 2-4 GM/100ML-% IVPB solution  - ADS Override Pull            • [COMPLETED] ceFAZolin in dextrose (ANCEF) IVPB solution 2 g  2 g Intravenous Once Sameer Edmonds III, MD   2 g at 08/12/21 1108   • ePHEDrine Sulfate 5 MG/ML injection  - ADS Override Pull            • fentaNYL 1 mcg/mL, Ropivacaine 0.2% in 200mL NS epidural   Epidural Continuous Mason Camacho DO   Stopped at 08/12/21 1020   • [COMPLETED] lactated ringers bolus 1,000 mL  1,000 mL Intravenous Once Nadira Power MD 4,000 mL/hr at 08/12/21 0555 1,000 mL at 08/12/21 0555   • lactated ringers infusion  125 mL/hr Intravenous Continuous Nadira Power MD   Stopped at 08/12/21 1002   • methylergonovine (METHERGINE) 0.2 MG/ML injection  - ADS Override Pull            • [COMPLETED] methylergonovine (METHERGINE) injection 200 mcg  200 mcg Intramuscular Once PRN Nadira Power MD   200 mcg at 08/12/21 1006   • [COMPLETED] oxytocin in sodium chloride (PITOCIN) 30 UNIT/500ML infusion solution  650 mL/hr Intravenous Once Nadira Power  mL/hr at 08/12/21 1002 650 mL/hr at 08/12/21 1002    Followed by   • [COMPLETED] oxytocin in sodium chloride (PITOCIN) 30 UNIT/500ML infusion solution  85 mL/hr Intravenous Once Nadira Power MD 85 mL/hr at 08/12/21 1038 85 mL/hr at 08/12/21 1038   • oxytocin in sodium chloride (PITOCIN) 30 UNIT/500ML infusion solution  2-20 danitza-units/min Intravenous Titrated Judith Dupree CNM   Stopped at 08/12/21 1002       Orders (last 48 hrs)       Start     Ordered    08/12/21 1247  CBC (No Diff)  STAT      08/12/21 1246    08/12/21 1244  Code Status and Medical Interventions:  Continuous      08/12/21 1244    08/12/21 1221  Urine Drug Screen - Urine, Clean Catch  Once      08/12/21 1220    08/12/21 1200  ceFAZolin in dextrose (ANCEF) IVPB solution 2 g  Once      08/12/21 1102    08/12/21 1134  Diet Regular  Diet Effective Now     Comments: PT HAS PEANUT ALLERGY    08/12/21 1134    08/12/21 1104  ceFAZolin in dextrose (ANCEF) 2-4 GM/100ML-% IVPB solution  - ADS Override Pull     Note to Pharmacy: Created by cabinet override    08/12/21 1104    08/12/21 1101  Morphine 10 MG/ML injection  - ADS Override Pull  Status:  Discontinued     Note to Pharmacy: Created by cabinet override    08/12/21 1101    08/12/21 1100  oxytocin in sodium chloride (PITOCIN) 30 UNIT/500ML infusion solution  Once      08/12/21 1007    08/12/21 1100  oxytocin in sodium chloride (PITOCIN) 30 UNIT/500ML infusion solution  Once      08/12/21 1007    08/12/21 1008  Notify Physician (specified)  Until Discontinued      08/12/21 1007    08/12/21 1008  Vital Signs Per hospital policy  Per Hospital Policy      08/12/21 1007    08/12/21 1008  Fundal & Lochia Check  Per Order Details     Comments: Every 15 Minutes x4, Then Every 30 Minutes x2, Then Every Shift    08/12/21 1007    08/12/21 1008  Diet Regular  Diet Effective Now,   Status:  Canceled      08/12/21 1007    08/12/21 1008  Blood Gas, Arterial, Cord  Once     Comments: If requested by provider at the time of delivery      08/12/21 1007    08/12/21 1008  Nurse May Remove Epidural Catheter After Delivery  Continuous      08/12/21 1007    08/12/21 1008  Transfer to postpartum when discharge criteria met.  Until Discontinued      08/12/21 1007    08/12/21 1007  Fundal & Lochia Check  Every Shift      08/12/21 1007    08/12/21 1007  acetaminophen (TYLENOL) tablet 650 mg  Every 4 Hours PRN,   Status:  Discontinued      08/12/21 1007     08/12/21 1007  ibuprofen (ADVIL,MOTRIN) tablet 600 mg  Every 6 Hours PRN,   Status:  Discontinued      08/12/21 1007 08/12/21 1007  oxyCODONE-acetaminophen (PERCOCET) 5-325 MG per tablet 1 tablet  Every 4 Hours PRN,   Status:  Discontinued      08/12/21 1007 08/12/21 1007  oxyCODONE-acetaminophen (PERCOCET)  MG per tablet 2 tablet  Every 4 Hours PRN,   Status:  Discontinued      08/12/21 1007 08/12/21 1007  methylergonovine (METHERGINE) injection 200 mcg  Once As Needed      08/12/21 1007 08/12/21 1007  carboprost (HEMABATE) injection 250 mcg  As Needed,   Status:  Discontinued      08/12/21 1007 08/12/21 1007  miSOPROStol (CYTOTEC) tablet 800 mcg  As Needed,   Status:  Discontinued      08/12/21 1007 08/12/21 1007  ondansetron (ZOFRAN) tablet 4 mg  Every 6 Hours PRN,   Status:  Discontinued      08/12/21 1007 08/12/21 1007  ondansetron (ZOFRAN) injection 4 mg  Every 6 Hours PRN,   Status:  Discontinued      08/12/21 1007 08/12/21 1005  methylergonovine (METHERGINE) 0.2 MG/ML injection  - ADS Override Pull     Note to Pharmacy: Created by cabinet override    08/12/21 1005    08/12/21 0700  Sod Citrate-Citric Acid (BICITRA) solution 30 mL  Once,   Status:  Discontinued      08/12/21 0602 08/12/21 0700  fentaNYL 1 mcg/mL, Ropivacaine 0.2% in 200mL NS epidural  Continuous      08/12/21 0602    08/12/21 0603  Vital Signs Per Anesthesia Guidelines  Per Hospital Policy     Comments: Every 3 Minutes x 20 Minutes following epidural dosing, then if stable every 15 Minutes    08/12/21 0602    08/12/21 0603  Start IV (16 or 18 Gauge)  Once      08/12/21 0602    08/12/21 0603  Fetal Heart Rate Monitor  Once      08/12/21 0602    08/12/21 0603  Nurse or Anesthesiologist to Remain With Patient for 15 Minutes Following Dosing  Continuous      08/12/21 0602    08/12/21 0603  Facilitate Maternal Position on Side & Maintain Uterine Displacement  Continuous      08/12/21 0602    08/12/21 0603  Consult  Anesthesia Prior to Changing Epidural Infusion / Rate  Continuous      08/12/21 0602    08/12/21 0602  lactated ringers bolus 1,000 mL  As Needed,   Status:  Discontinued      08/12/21 0602    08/12/21 0602  ePHEDrine Sulfate 5 MG/ML injection 10 mg  Every 10 Minutes PRN,   Status:  Discontinued      08/12/21 0602    08/12/21 0602  diphenhydrAMINE (BENADRYL) injection 12.5 mg  Every 8 Hours PRN,   Status:  Discontinued      08/12/21 0602    08/12/21 0602  metoclopramide (REGLAN) injection 10 mg  Once As Needed,   Status:  Discontinued      08/12/21 0602    08/12/21 0602  ondansetron (ZOFRAN) injection 4 mg  Once As Needed,   Status:  Discontinued      08/12/21 0602    08/12/21 0602  famotidine (PEPCID) injection 20 mg  Once As Needed,   Status:  Discontinued      08/12/21 0602    08/12/21 0500  oxytocin in sodium chloride (PITOCIN) 30 UNIT/500ML infusion solution  Titrated      08/11/21 2207    08/12/21 0000  Colmenares Bulb Cervical Ripening w/ infusion  Once     Comments: Have laborist place cervical Colmenares w/ infusion.  If unable to place Colmenares, start low dose Pitocin drip overnight then increase per protocol @@ 5 am.    08/11/21 1614    08/12/21 0000  GTT 1 Hour     Comments: This is an external result entered through the Results Console.      08/12/21 0749    08/11/21 2100  sodium chloride 0.9 % flush 3 mL  Every 12 Hours Scheduled,   Status:  Discontinued      08/11/21 1614 08/11/21 2000  POC Glucose Q4H  Every 4 Hours     Comments: If patient is gestational diabetic      08/11/21 1614    08/11/21 1810  ePHEDrine Sulfate 5 MG/ML injection  - ADS Override Pull     Note to Pharmacy: Created by cabinet override    08/11/21 1810 08/11/21 1810  erythromycin (ROMYCIN) 5 MG/GM ophthalmic ointment  - ADS Override Pull  Status:  Discontinued     Note to Pharmacy: Created by cabinet override    08/11/21 1810 08/11/21 1700  lactated ringers bolus 1,000 mL  Once      08/11/21 1614 08/11/21 1700  lactated ringers  infusion  Continuous      21 1614    21 1700  mineral oil liquid 30 mL  Once,   Status:  Discontinued      21 1614    21 1700  clindamycin (CLEOCIN) 900 mg in dextrose 5% 50 mL IVPB (premix)  Every 8 Hours,   Status:  Discontinued      21 1614    21 1614  Admit To Obstetrics Inpatient  Once      21 1614    21 1614  Obtain informed consent  Once      21 1614    21 1614  VTE Prophylaxis Not Indicated: No Risk Factors (0); </= 3 (Low Risk)  Once      21 1614    21 1614  Vital Signs Per hospital policy  Per Hospital Policy      21 1614    21 1614  Continuous Fetal Monitoring With NST on Admission and Prior to Initiation of Oxytocin.  Per Order Details     Comments: Continuous Fetal Monitoring With NST on Admission & Prior to Initiation of Oxytocin.    21 1614    21 1614  External Uterine Contraction Monitoring  Per Hospital Policy      21 1614    21 1614  Notify Physician (specified)  Until Discontinued      21 1614    21 1614  Notify physician for tachysystole (per hospital algorithm)  Until Discontinued      21 1614    21 1614  Notify physician if membranes ruptured, bleeding greater than 1 pad an hour, fetal heart tone abnormality, and severe pain  Until Discontinued      21 1614    21 1614  Up Ad Latonia  Until Discontinued      21 1614    21 1614  Cervical Exam  Once     Comments: Unless contraindicated, every 1-2 hours in active labor, or at nurse's discretion.    21 1614    21 1614  Initiate Group Beta Strep (GBS) Prophylaxis Protocol, If Criteria Met  Continuous     Comments: NO TREATMENT RECOMMENDED IF: 1)  Maternal GBS status known negative 2)  Scheduled  birth with intact membranes, not in labor.  3 ) Maternal GBS unknown, no risk factors.   TREAT WITH ANTIBIOTICS IF:  1)  Maternal GBS status is known postive.  2)  Maternal GBS status  unknown with these risk factors:  a)  Previous infant affected by GBS infection.  b)  GBS urinary tract infection (UTI) or bacteruria during pregnancy  c)  Unexplained maternal fever in labor (greater than or equal to 100.4F or 38.0C)  d)  Prolonged rupture of the membranes greater than or equal to 18 hours.  e)  Gestational age less than 37 weeks.    08/11/21 1614 08/11/21 1614  Bedside ultrasound for fetal presentation  Once      08/11/21 1614    08/11/21 1614  Diet Clear Liquid  Diet Effective Now,   Status:  Canceled      08/11/21 1614    08/11/21 1614  Insert Peripheral IV  Once      08/11/21 1614 08/11/21 1614  Saline Lock & Maintain IV Access  Continuous      08/11/21 1614 08/11/21 1613  terbutaline (BRETHINE) injection 0.25 mg  As Needed,   Status:  Discontinued      08/11/21 1614 08/11/21 1613  lidocaine PF 1% (XYLOCAINE) injection 5 mL  As Needed,   Status:  Discontinued      08/11/21 1614 08/11/21 1613  sodium chloride 0.9 % flush 3-10 mL  As Needed,   Status:  Discontinued      08/11/21 1614    08/11/21 1613  butorphanol (STADOL) injection 1 mg  Every 2 Hours PRN,   Status:  Discontinued      08/11/21 1614    08/11/21 1613  butorphanol (STADOL) injection 2 mg  Every 2 Hours PRN,   Status:  Discontinued      08/11/21 1614    08/11/21 1613  ondansetron (ZOFRAN) tablet 4 mg  Every 6 Hours PRN,   Status:  Discontinued      08/11/21 1614    08/11/21 1613  ondansetron (ZOFRAN) injection 4 mg  Every 6 Hours PRN,   Status:  Discontinued      08/11/21 1614    08/11/21 1510  COVID PRE-OP / PRE-PROCEDURE SCREENING ORDER (NO ISOLATION) - Swab, Nasopharynx  Once      08/11/21 1509    08/11/21 1510  COVID-19, ABBOTT IN-HOUSE,NASAL Swab (NO TRANSPORT MEDIA) 2 HR TAT - Swab, Nasopharynx  PROCEDURE ONCE      08/11/21 1509    08/11/21 1433  CBC (No Diff)  STAT      08/11/21 1433    08/11/21 1433  Type & Screen  STAT      08/11/21 1433    08/11/21 1433  Preeclampsia Panel  STAT      08/11/21 1433    08/11/21  0000  Group B Streptococcus Culture - Swab, Vaginal/Rectum     Comments: This is an external result entered through the Results Console.      08/11/21 1733    Unscheduled  Position change  As Needed     Comments: For intra-uterine resuscitation for hypertonus, hypertstimulation, or non-reassuring fetal status    08/11/21 1614    Unscheduled  POC Protein, Urine, Qualitative, Dipstick  As Needed     Comments: Diastolic BP Greater Than 90      08/11/21 1614    Unscheduled  Up with Assistance  As Needed      08/12/21 1007    Unscheduled  Apply Ice to Perineum  As Needed      08/12/21 1007    Unscheduled  Bladder Assessment  As Needed      08/12/21 1007    --  Prenatal Vit-Fe Fumarate-FA (Prenatal 27-1) 27-1 MG tablet tablet  Daily      08/11/21 1419    --  ferrous sulfate 324 (65 Fe) MG tablet delayed-release EC tablet  Daily With Breakfast      08/11/21 1419    --  EPINEPHrine (EPIPEN) 0.3 MG/0.3ML solution auto-injector injection  Status:  Discontinued      08/11/21 1859    --  SCANNED - LABS      08/11/21 0000    Signed and Held  Transfer Patient  Once      Signed and Held    Signed and Held  Code Status and Medical Interventions:  Continuous      Signed and Held    Signed and Held  Vital Signs Per hospital policy  Per Hospital Policy      Signed and Held    Signed and Held  Notify Physician  Until Discontinued      Signed and Held    Signed and Held  Up Ad Latonia  Until Discontinued      Signed and Held    Signed and Held  Ambulate Patient  Every Shift      Signed and Held    Signed and Held  Diet Regular  Diet Effective Now      Signed and Held    Signed and Held  Advance Diet as Tolerated  Until Discontinued      Signed and Held    Signed and Held  Fundal and Lochia Check  Per Hospital Policy     Comments: Q 15 min x 4, Q 30 min x 2, then Q Shift    Signed and Held    Signed and Held  RN to Assess Rh Status & Place RhIG Evaluation Order if Indicated  Continuous      Signed and Held    Signed and Held  Bladder  Assessment  Per Order Details     Comments: Postpartum 1) Upon Admission to Unit & Every 4 Hours PRN Until Voiding. 2) Out of Bed to Void in 8 Hours.    Signed and Held    Signed and Held  Straight Cath  Per Order Details     Comments: Postpartum: If Distended & Unable to Void, May Repeat Once.    Signed and Held    Signed and Held  Indwelling Urinary Catheter  Per Order Details     Comments: Postpartum : After Straight Cathed x2 or if Greater Than 1000mL Residual, Insert Indwelling Urinary Catheter Until Further MD Order.    Signed and Held    Signed and Held  Remove Vaginal Packing  Once      Signed and Held    Signed and Held  Apply Ice to Perineum  As Needed     Comments: For 20 min q 2 hrs    Signed and Held    Signed and Held  Waffle Cushion  As Needed     Comments: For perineal discomfort    Signed and Held    Signed and Held  Donut Ring  As Needed     Comments: For perineal pain    Signed and Held    Signed and Held  Sitz Bath  3 Times Daily     Comments: PRN    Signed and Held    Signed and Held  Kpad  As Needed     Comments: For pain    Signed and Held    Signed and Held  Warm compress  As Needed      Signed and Held    Signed and Held  Breast pump to bed  Once      Signed and Held    Signed and Held  Apply ace wrap, tight bra, or binder  As Needed      Signed and Held    Signed and Held  Apply ice packs  As Needed      Signed and Held    Signed and Held  If indicated -- Please administer RH Immunoglobulin based on results of cord blood evaluation and fetal screen lab tests, pharmacy to dispense  Per Order Details     Comments: See Process Instructions For Reference Range Details.    Signed and Held    Signed and Held  CBC & Differential  Timed     Comments: Postpartum Day 1      Signed and Held    Signed and Held  sodium chloride 0.9 % flush 1-10 mL  As Needed      Signed and Held    Signed and Held  ibuprofen (ADVIL,MOTRIN) tablet 600 mg  Every 6 Hours PRN      Signed and Held    Signed and Held   bisacodyl (DULCOLAX) suppository 10 mg  Daily PRN      Signed and Held    Signed and Held  magnesium hydroxide (MILK OF MAGNESIA) suspension 2400 mg/10mL 10 mL  Daily PRN      Signed and Held    Signed and Held  docusate sodium (COLACE) capsule 100 mg  2 Times Daily      Signed and Held    Signed and Held  lanolin cream  Every 1 Hour PRN      Signed and Held    Signed and Held  benzocaine-menthol (DERMOPLAST) 20-0.5 % topical spray  As Needed      Signed and Held    Signed and Held  witch hazel-glycerin (TUCKS) pad 1 pad  As Needed      Signed and Held    Signed and Held  Hydrocortisone (Perianal) (ANUSOL-HC) 2.5 % rectal cream 1 application  As Needed      Signed and Held    Signed and Held  benzocaine (AMERICAINE) 20 % rectal ointment 1 application  As Needed      Signed and Held    Signed and Held  ondansetron (ZOFRAN) injection 4 mg  Every 6 Hours PRN      Signed and Held    Signed and Held  VTE Prophylaxis Not Indicated: No Risk Factors (0); </= 3 (Low Risk)  Once      Signed and Held    Signed and Held  HYDROcodone-acetaminophen (NORCO) 5-325 MG per tablet 1 tablet  Every 4 Hours PRN      Signed and Held                 Operative/Procedure Notes (last 48 hours) (Notes from 08/10/21 1412 through 08/12/21 1412)      Sameer Edmonds III, MD at 08/11/21 1709      Pre-procedure Diagnoses    1. Unfavorable cervix in term pregnancy [O34.40]    2. Gestational hypertension, third trimester [O13.3]           Post-procedure Diagnoses    1. Unfavorable cervix in term pregnancy [O34.40]    2. Gestational hypertension, third trimester [O13.3]           Procedures    1. INSERTION OF CERVICAL DILATOR [OBO3 (Custom)]             Patient admitted for induction of labor at 37 weeks 5 days gestation secondary to gestational hypertension.  Received request for placement of transcervical Colmenares bulb for cervical ripening.  Cervix 1 cm / 60%/-3 station (cervix mid position and medium texture).  Cephalic presentation confirmed by  "bedside ultrasound.  Transcervical Colmenares placed per physician request.  FHT's class 1.  Patient tolerated well.  24 Yi, 60ml.    Sameer Pond \"Tamara\" RJ Edmonds MD  8/11/2021  17:11 EDT          Electronically signed by Sameer Edmonds III, MD at 08/11/21 1711     Jennifer Mitchell CNM at 08/12/21 1252          [facility]  Vaginal Delivery Note    Delivery     Delivery: Vaginal, Spontaneous  Z3A.37   YOB: 2020    Time of Birth: 9:57 AM      Anesthesia: Epidural     Delivering clinician: Jennifer Mitchell    Forceps?   No   Vacuum? No    Shoulder dystocia present: No        Delivery narrative:  Patient pushed to deliver a male infant over a second degree laceration through a double nuchal cord. Spontaneous and vigorous cry. Infant was unwrapped from cord and placed on maternal abdomen where he was dried.  The cord was milked x4 then doubly clamped and cut. The placenta delivered spontaneously and visually intact.  Uterine atony was noted.  IV not infusing.  IM methergine administered. Uterine firmed with further massage. The perineum and vagina were inspected for lacerations.  Dr. Edmonds was consulted for assistance with repair.  A horizontal, JASMINA PUL was repaired using 3-0 vicryl.  A left sulcus laceration which extended horizontally to the right was repaired with 2-0 and 3-0 rapide. A second degree laceration was repaired using 2-0 and 3-0 rapide.  All counts were correct. Mom and baby entered recovery in stable condition.     Infant    Findings: male  infant, Arnoldo Masoud     Infant observations: Weight: 3650 g (8 lb 0.8 oz)   Length: 20.25  in  Observations/Comments:        Apgars: 8  @ 1 minute /    9  @ 5 minutes         Placenta, Cord, and Fluid    Placenta delivered  Spontaneous  at  8/12/2021 10:02 AM     Cord: 3 vessels  present.   Nuchal Cord?  yes; Number of nuchal loops present:  2    Cord blood obtained: Yes    Cord gases obtained:  No      Repair    Episiotomy: No "   Lacerations: Yes  Laceration Information  Laceration Repaired?   Perineal: 2nd  Yes    Periurethral:   Yes    Labial:       Sulcus:   Yes    Vaginal:       Cervical:            Estimated Blood Loss:   400 mls.   Suture used for repair: 2-0 and 3-0 Vicryl         Complications  none    Disposition  Mother to Mother Baby/Postpartum  in stable condition currently.  Baby to remains with mom  in stable condition currently.      Jennifer Mitchell CNM  08/12/21  12:52 EDT        Electronically signed by Jennifer Mitchell CNM at 08/12/21 1312         Physician Progress Notes (last 48 hours) (Notes from 08/10/21 1412 through 08/12/21 1412)    No notes of this type exist for this encounter.

## 2021-08-13 LAB
BASOPHILS # BLD AUTO: 0.04 10*3/MM3 (ref 0–0.2)
BASOPHILS NFR BLD AUTO: 0.3 % (ref 0–1.5)
DEPRECATED RDW RBC AUTO: 58.9 FL (ref 37–54)
EOSINOPHIL # BLD AUTO: 0.08 10*3/MM3 (ref 0–0.4)
EOSINOPHIL NFR BLD AUTO: 0.5 % (ref 0.3–6.2)
ERYTHROCYTE [DISTWIDTH] IN BLOOD BY AUTOMATED COUNT: 18.8 % (ref 12.3–15.4)
HCT VFR BLD AUTO: 24.3 % (ref 34–46.6)
HGB BLD-MCNC: 7.9 G/DL (ref 12–15.9)
IMM GRANULOCYTES # BLD AUTO: 0.12 10*3/MM3 (ref 0–0.05)
IMM GRANULOCYTES NFR BLD AUTO: 0.8 % (ref 0–0.5)
LYMPHOCYTES # BLD AUTO: 1.91 10*3/MM3 (ref 0.7–3.1)
LYMPHOCYTES NFR BLD AUTO: 13 % (ref 19.6–45.3)
MCH RBC QN AUTO: 27.9 PG (ref 26.6–33)
MCHC RBC AUTO-ENTMCNC: 32.5 G/DL (ref 31.5–35.7)
MCV RBC AUTO: 85.9 FL (ref 79–97)
MONOCYTES # BLD AUTO: 1.17 10*3/MM3 (ref 0.1–0.9)
MONOCYTES NFR BLD AUTO: 7.9 % (ref 5–12)
NEUTROPHILS NFR BLD AUTO: 11.4 10*3/MM3 (ref 1.7–7)
NEUTROPHILS NFR BLD AUTO: 77.5 % (ref 42.7–76)
NRBC BLD AUTO-RTO: 0 /100 WBC (ref 0–0.2)
PLATELET # BLD AUTO: 146 10*3/MM3 (ref 140–450)
PMV BLD AUTO: 11.5 FL (ref 6–12)
RBC # BLD AUTO: 2.83 10*6/MM3 (ref 3.77–5.28)
WBC # BLD AUTO: 14.72 10*3/MM3 (ref 3.4–10.8)

## 2021-08-13 PROCEDURE — 85025 COMPLETE CBC W/AUTO DIFF WBC: CPT | Performed by: NURSE PRACTITIONER

## 2021-08-13 RX ADMIN — IBUPROFEN 600 MG: 600 TABLET, FILM COATED ORAL at 19:53

## 2021-08-13 RX ADMIN — IBUPROFEN 600 MG: 600 TABLET, FILM COATED ORAL at 12:14

## 2021-08-13 RX ADMIN — IBUPROFEN 600 MG: 600 TABLET, FILM COATED ORAL at 05:29

## 2021-08-13 RX ADMIN — DOCUSATE SODIUM 100 MG: 100 CAPSULE, LIQUID FILLED ORAL at 08:35

## 2021-08-13 RX ADMIN — DOCUSATE SODIUM 100 MG: 100 CAPSULE, LIQUID FILLED ORAL at 19:53

## 2021-08-13 NOTE — LACTATION NOTE
08/13/21 1750   Maternal Information   Person Making Referral nurse   Maternal Reason for Referral   (mom worried about baby getting enough--wants to pump/formula)   Milk Expression/Equipment   Breast Pump Type double electric, personal  (using personal pump)   Breast Pump Flange Type hard   Breast Pump Flange Size 24 mm   Breast Pumping   Breast Pumping Interventions post-feed pumping encouraged   Teaching done as documented under Education. To call lactation services, if there are questions or concerns.

## 2021-08-13 NOTE — PAYOR COMM NOTE
"Juan Flores (26 y.o. Female)     [employer] ID#[id number]    Delivery information.    From: Andria Watson  #[phone]  Fax#[phone]      Date of Birth Social Security Number Address Home Phone MRN    1995  [address] [place] [address] [phone] [id number]    Spiritism Marital Status          Adventist        Admission Date Admission Type Admitting Provider Attending Provider Department, Room/Bed    21 Elective Nadira Power MD Fuson, Jennifer A, MD [facility] MOTHER BABY 4A, N420/1    Discharge Date Discharge Disposition Discharge Destination                       Attending Provider: Nadira Power MD    Allergies: Peanut-containing Drug Products    Isolation: None   Infection: None   Code Status: CPR    Ht: 165.1 cm (65\")   Wt: 91.2 kg (201 lb)    Admission Cmt: None   Principal Problem: None                Active Insurance as of 2021     Primary Coverage     Payor Plan Insurance Group Employer/Plan Group    [employer] MEDICAID [facility] MEDICAID [id number]     Payor Plan Address Payor Plan Phone Number Payor Plan Fax Number Effective Dates    [address] [address] [phone]  2021 - None Entered    [facility] [id number]       Subscriber Name Subscriber Birth Date Member ID       JUAN FLORES 1995 [id number]                 Emergency Contacts      (Rel.) Home Phone Work Phone Mobile Phone    KANWAL FLORES (Spouse) -- -- [phone]            Problem List         Codes Noted - Resolved       Hospital     (normal spontaneous vaginal delivery) ICD-10-CM: O80  ICD-9-CM: 650 2021 - Present             History & Physical      Nadira Power MD at 21 [address] [place] [address]  Obstetric History and Physical    Chief Complaint   Patient presents with   • Scheduled Induction       Subjective     Patient is a 26 y.o. female  currently at 37w5d, who presents for induction of labor secondary " to BPP 6/10, polyhydramnios, LGA and bp 135/90 in the office today. She denies headache, visual change, epigastric pain.     The following portions of the patients history were reviewed and updated as appropriate: current medications, allergies, past medical history, past surgical history, past family history, past social history and problem list .       Prenatal Information:   Maternal Prenatal Labs  Blood Type No results found for: ABO   Rh Status No results found for: RH   Antibody Screen No results found for: ABSCRN   Gonnorhea No results found for: GCCX   Chlamydia No results found for: CLAMYDCU   RPR No results found for: RPR   Syphilis Antibody No results found for: SYPHILIS   Rubella No results found for: RUBELLAIGGIN   Hepatitis B Surface Antigen No results found for: HEPBSAG   HIV-1 Antibody No results found for: LABHIV1   Hepatitis C Antibody No results found for: HEPCAB   Rapid Urin Drug Screen No results found for: AMPMETHU, BARBITSCNUR, LABBENZSCN, LABMETHSCN, LABOPIASCN, THCURSCR, COCAINEUR, AMPHETSCREEN, PROPOXSCN, BUPRENORSCNU, METAMPSCNUR, OXYCODONESCN, TRICYCLICSCN   Group B Strep Culture No results found for: GBSANTIGEN                 Past OB History:       OB History    Para Term  AB Living   1 0 0 0 0 0   SAB TAB Ectopic Molar Multiple Live Births   0 0 0 0 0 0      # Outcome Date GA Lbr Amanuel/2nd Weight Sex Delivery Anes PTL Lv   1 Current                Past Medical History: Past Medical History:   Diagnosis Date   • Anemia     during pregnancy   • Scoliosis    • Urinary tract infection       Past Surgical History No past surgical history on file.   Family History: No family history on file.   Social History:  reports that she has never smoked. She does not have any smokeless tobacco history on file.   reports previous alcohol use.   reports no history of drug use.        REVIEW OF SYSTEMS              Reports fetal movement is normal             Denies leakage of amniotic  fluid.             Denies vaginal bleeding             She reports No contractions             All other systems reviewed and are negative    Objective       Vital Signs Range for the last 24 hours  Temperature: Temp:  [98.2 °F (36.8 °C)] 98.2 °F (36.8 °C)   Temp Source: Temp src: Oral   BP: BP: (122-132)/(78-79) 122/79   Pulse: Heart Rate:  [113-121] 113   Respirations: Resp:  [16] 16   SPO2:     O2 Amount (l/min):     O2 Devices     Weight: Weight:  [91.2 kg (201 lb)] 91.2 kg (201 lb)       Presentation: cephalic   Cervix: Exam by:     Dilation:     Effacement:     Station:       Fetal Heart Rate Assessment   Method:     Beats/min:     Baseline:     Variability:     Accels:     Decels:     Tracing Category:       NST:  NON-REACTIVE    Constitutional:  Well developed, well nourished, no acute distress, well-groomed.   Respiratory:  Lungs are clear to auscultation bilaterally, normal breath sounds.   Cardiovascular:  Normal rate and rhythm, no murmurs.   Gastrointestinal:  Soft, gravid, nontender.  Uterus: Soft, nontender. Fundus appropriate for dates.  Neurologic:  Alert & oriented x 3,  no focal deficits noted.   Psychiatric:  Speech and behavior appropriate.   Extremities: no cyanosis, clubbing or edema, no evidence of DVT.        Labs:  Lab Results   Component Value Date    WBC 10.21 08/11/2021    HGB 12.1 08/11/2021    HCT 36.4 08/11/2021    MCV 84.1 08/11/2021     08/11/2021    URICACID 3.5 08/11/2021    AST 13 08/11/2021    ALT 8 08/11/2021     08/11/2021               Assessment/Plan       Term pregnancy        Assessment:  1.  Intrauterine pregnancy at 37w5d weeks gestation  2.  Polyhydramnios  3.  LGA  4.  Gestational hypertension  Plan:  1. Admit for cervical ripening and IOL>       Nadira Power MD  8/11/2021  16:14 EDT    Electronically signed by Nadira Power MD at 08/11/21 1617     H&P signed by [employer], [place] at 08/11/21 1731      Scan on 8/11/2021 by [employer], [place]:  GBS result          Electronically signed by [employer], [place] at 08/11/21 1731     H&P signed by [employer], [place] at 08/11/21 1533      Scan on 8/11/2021 by [employer], [place]: PRENATAL LWH 8/11/2021          Electronically signed by [employer], [place] at 08/11/21 1533       Lab Results (last 48 hours)     Procedure Component Value Units Date/Time    Urine Drug Screen - Urine, Clean Catch [[id number]]  (Normal) Collected: 08/12/21 1221    Specimen: Urine, Clean Catch Updated: 08/12/21 1352     THC, Screen, Urine Negative     Phencyclidine (PCP), Urine Negative     Cocaine Screen, Urine Negative     Methamphetamine, Ur Negative     Opiate Screen Negative     Amphetamine Screen, Urine Negative     Benzodiazepine Screen, Urine Negative     Tricyclic Antidepressants Screen Negative     Methadone Screen, Urine Negative     Barbiturates Screen, Urine Negative     Oxycodone Screen, Urine Negative     Propoxyphene Screen Negative     Buprenorphine, Screen, Urine Negative    Narrative:      Cutoff For Drugs Screened:    Amphetamines               500 ng/ml  Barbiturates               200 ng/ml  Benzodiazepines            150 ng/ml  Cocaine                    150 ng/ml  Methadone                  200 ng/ml  Opiates                    100 ng/ml  Phencyclidine               25 ng/ml  THC                            50 ng/ml  Methamphetamine            500 ng/ml  Tricyclic Antidepressants  300 ng/ml  Oxycodone                  100 ng/ml  Propoxyphene               300 ng/ml  Buprenorphine               10 ng/ml    The normal value for all drugs tested is negative. This report includes unconfirmed screening results, with the cutoff values listed, to be used for medical treatment purposes only.  Unconfirmed results must not be used for non-medical purposes such as employment or legal testing.  Clinical consideration should be applied to any drug of abuse test, particularly when unconfirmed results are used.      CBC (No  Diff) [[id number]]  (Abnormal) Collected: 08/12/21 1254    Specimen: Blood Updated: 08/12/21 1328     WBC 19.64 10*3/mm3      RBC 3.64 10*6/mm3      Hemoglobin 10.0 g/dL      Hematocrit 31.5 %      MCV 86.5 fL      MCH 27.5 pg      MCHC 31.7 g/dL      RDW 18.6 %      RDW-SD 58.5 fl      MPV 11.7 fL      Platelets 190 10*3/mm3     SCANNED - LABS [[id number]] Resulted: 08/11/21     Updated: 08/12/21 1054    GTT 1 Hour [[id number]] Resulted: 06/09/21    Specimen: Blood Updated: 08/12/21 0749     External GTT 1 Hour 108    Group B Streptococcus Culture - Swab, Vaginal/Rectum [[id number]] Resulted: 08/04/21    Specimen: Swab from Vaginal/Rectum Updated: 08/11/21 1733     External Strep Group B Ag NEG    Preeclampsia Panel [[id number]]  (Abnormal) Collected: 08/11/21 1510    Specimen: Blood Updated: 08/11/21 1550     Alkaline Phosphatase 156 U/L      ALT (SGPT) 8 U/L      AST (SGOT) 13 U/L      Creatinine 0.51 mg/dL      Total Bilirubin <0.2 mg/dL       U/L      Comment: Specimen hemolyzed.  Results may be affected.        Uric Acid 3.5 mg/dL     COVID PRE-OP / PRE-PROCEDURE SCREENING ORDER (NO ISOLATION) - Swab, Nasopharynx [[id number]]  (Normal) Collected: 08/11/21 1512    Specimen: Swab from Nasopharynx Updated: 08/11/21 1544    Narrative:      The following orders were created for panel order COVID PRE-OP / PRE-PROCEDURE SCREENING ORDER (NO ISOLATION) - Swab, Nasopharynx.  Procedure                               Abnormality         Status                     ---------                               -----------         ------                     COVID-19, ABBOTT IN-HOUS...[[id number]]  Normal              Final result                 Please view results for these tests on the individual orders.    COVID-19, ABBOTT IN-HOUSE,NASAL Swab (NO TRANSPORT MEDIA) 2 HR TAT - Swab, Nasopharynx [[id number]]  (Normal) Collected: 08/11/21 1512    Specimen: Swab from Nasopharynx Updated: 08/11/21 1544     COVID19 Presumptive Negative     Narrative:      Fact sheet for providers: https://www.fda.gov/media/524023/download     Fact sheet for patients: https://www.fda.gov/media/151648/download    Test performed by PCR.  If inconsistent with clinical signs and symptoms patient should be tested with different authorized molecular test.    CBC (No Diff) [[id number]]  (Abnormal) Collected: 08/11/21 1510    Specimen: Blood Updated: 08/11/21 1528     WBC 10.21 10*3/mm3      RBC 4.33 10*6/mm3      Hemoglobin 12.1 g/dL      Hematocrit 36.4 %      MCV 84.1 fL      MCH 27.9 pg      MCHC 33.2 g/dL      RDW 18.5 %      RDW-SD 56.4 fl      MPV 11.6 fL      Platelets 184 10*3/mm3             Orders (last 48 hrs)      Start     Ordered    08/13/21 0800  Sitz Bath  3 Times Daily     Comments: PRN    08/12/21 1506    08/13/21 0600  CBC & Differential  Timed     Comments: Postpartum Day 1      08/12/21 1506    08/13/21 0600  CBC Auto Differential  PROCEDURE ONCE     Comments: Postpartum Day 1      08/12/21 2208    08/13/21 0000  bisacodyl (DULCOLAX) suppository 10 mg  Daily PRN      08/12/21 1506    08/12/21 2100  docusate sodium (COLACE) capsule 100 mg  2 Times Daily      08/12/21 1506    08/12/21 1507  Transfer Patient  Once      08/12/21 1506    08/12/21 1507  Code Status and Medical Interventions:  Continuous      08/12/21 1506    08/12/21 1507  Vital Signs Per hospital policy  Per Hospital Policy      08/12/21 1506    08/12/21 1507  Notify Physician  Until Discontinued      08/12/21 1506    08/12/21 1507  Up Ad Latonia  Until Discontinued      08/12/21 1506    08/12/21 1507  Ambulate Patient  Every Shift      08/12/21 1506    08/12/21 1507  Diet Regular  Diet Effective Now      08/12/21 1506    08/12/21 1507  Advance Diet as Tolerated  Until Discontinued      08/12/21 1506    08/12/21 1507  Fundal and Lochia Check  Per Hospital Policy     Comments: Q 15 min x 4, Q 30 min x 2, then Q Shift    08/12/21 1506    08/12/21 1507  RN to Assess Rh Status & Place RhIG  Evaluation Order if Indicated  Continuous      08/12/21 1506    08/12/21 1507  Bladder Assessment  Per Order Details     Comments: Postpartum 1) Upon Admission to Unit & Every 4 Hours PRN Until Voiding. 2) Out of Bed to Void in 8 Hours.    08/12/21 1506    08/12/21 1507  Straight Cath  Per Order Details     Comments: Postpartum: If Distended & Unable to Void, May Repeat Once.    08/12/21 1506    08/12/21 1507  Indwelling Urinary Catheter  Per Order Details     Comments: Postpartum : After Straight Cathed x2 or if Greater Than 1000mL Residual, Insert Indwelling Urinary Catheter Until Further MD Order.    08/12/21 1506    08/12/21 1507  Remove Vaginal Packing  Once      08/12/21 1506    08/12/21 1507  Breast pump to bed  Once      08/12/21 1506    08/12/21 1507  If indicated -- Please administer RH Immunoglobulin based on results of cord blood evaluation and fetal screen lab tests, pharmacy to dispense  Per Order Details     Comments: See Process Instructions For Reference Range Details.    08/12/21 1506    08/12/21 1507  VTE Prophylaxis Not Indicated: No Risk Factors (0); </= 3 (Low Risk)  Once      08/12/21 1506    08/12/21 1506  ibuprofen (ADVIL,MOTRIN) tablet 600 mg  Every 6 Hours PRN      08/12/21 1506    08/12/21 1506  magnesium hydroxide (MILK OF MAGNESIA) suspension 2400 mg/10mL 10 mL  Daily PRN      08/12/21 1506    08/12/21 1506  lanolin cream  Every 1 Hour PRN      08/12/21 1506    08/12/21 1506  benzocaine-menthol (DERMOPLAST) 20-0.5 % topical spray  As Needed      08/12/21 1506    08/12/21 1506  witch hazel-glycerin (TUCKS) pad 1 pad  As Needed      08/12/21 1506    08/12/21 1506  Hydrocortisone (Perianal) (ANUSOL-HC) 2.5 % rectal cream 1 application  As Needed      08/12/21 1506    08/12/21 1506  ondansetron (ZOFRAN) injection 4 mg  Every 6 Hours PRN      08/12/21 1506    08/12/21 1506  HYDROcodone-acetaminophen (NORCO) 5-325 MG per tablet 1 tablet  Every 4 Hours PRN      08/12/21 1506    08/12/21 1506   sodium chloride 0.9 % flush 1-10 mL  As Needed      08/12/21 1506    08/12/21 1506  benzocaine (AMERICAINE) 20 % rectal ointment 1 application  As Needed      08/12/21 1506    08/12/21 1247  CBC (No Diff)  STAT      08/12/21 1246    08/12/21 1244  Code Status and Medical Interventions:  Continuous,   Status:  Canceled      08/12/21 1244    08/12/21 1221  Urine Drug Screen - Urine, Clean Catch  Once      08/12/21 1220    08/12/21 1200  ceFAZolin in dextrose (ANCEF) IVPB solution 2 g  Once      08/12/21 1102    08/12/21 1134  Diet Regular  Diet Effective Now,   Status:  Canceled     Comments: PT HAS PEANUT ALLERGY    08/12/21 1134    08/12/21 1104  ceFAZolin in dextrose (ANCEF) 2-4 GM/100ML-% IVPB solution  - ADS Override Pull     Note to Pharmacy: Created by cabinet override    08/12/21 1104    08/12/21 1101  Morphine 10 MG/ML injection  - ADS Override Pull  Status:  Discontinued     Note to Pharmacy: Created by cabinet override    08/12/21 1101    08/12/21 1100  oxytocin in sodium chloride (PITOCIN) 30 UNIT/500ML infusion solution  Once      08/12/21 1007    08/12/21 1100  oxytocin in sodium chloride (PITOCIN) 30 UNIT/500ML infusion solution  Once      08/12/21 1007    08/12/21 1008  Notify Physician (specified)  Until Discontinued,   Status:  Canceled      08/12/21 1007    08/12/21 1008  Vital Signs Per hospital policy  Per Hospital Policy,   Status:  Canceled      08/12/21 1007    08/12/21 1008  Fundal & Lochia Check  Per Order Details,   Status:  Canceled     Comments: Every 15 Minutes x4, Then Every 30 Minutes x2, Then Every Shift    08/12/21 1007    08/12/21 1008  Diet Regular  Diet Effective Now,   Status:  Canceled      08/12/21 1007    08/12/21 1008  Blood Gas, Arterial, Cord  Once     Comments: If requested by provider at the time of delivery      08/12/21 1007    08/12/21 1008  Nurse May Remove Epidural Catheter After Delivery  Continuous,   Status:  Canceled      08/12/21 1007    08/12/21 1008  Transfer  to postpartum when discharge criteria met.  Until Discontinued,   Status:  Canceled      08/12/21 1007 08/12/21 1007  Up with Assistance  As Needed,   Status:  Canceled      08/12/21 1007 08/12/21 1007  Fundal & Lochia Check  Every Shift,   Status:  Canceled      08/12/21 1007 08/12/21 1007  Apply Ice to Perineum  As Needed,   Status:  Canceled      08/12/21 1007 08/12/21 1007  Bladder Assessment  As Needed,   Status:  Canceled      08/12/21 1007 08/12/21 1007  acetaminophen (TYLENOL) tablet 650 mg  Every 4 Hours PRN,   Status:  Discontinued      08/12/21 1007 08/12/21 1007  ibuprofen (ADVIL,MOTRIN) tablet 600 mg  Every 6 Hours PRN,   Status:  Discontinued      08/12/21 1007 08/12/21 1007  oxyCODONE-acetaminophen (PERCOCET) 5-325 MG per tablet 1 tablet  Every 4 Hours PRN,   Status:  Discontinued      08/12/21 1007 08/12/21 1007  oxyCODONE-acetaminophen (PERCOCET)  MG per tablet 2 tablet  Every 4 Hours PRN,   Status:  Discontinued      08/12/21 1007 08/12/21 1007  methylergonovine (METHERGINE) injection 200 mcg  Once As Needed      08/12/21 1007 08/12/21 1007  carboprost (HEMABATE) injection 250 mcg  As Needed,   Status:  Discontinued      08/12/21 1007 08/12/21 1007  miSOPROStol (CYTOTEC) tablet 800 mcg  As Needed,   Status:  Discontinued      08/12/21 1007 08/12/21 1007  ondansetron (ZOFRAN) tablet 4 mg  Every 6 Hours PRN,   Status:  Discontinued      08/12/21 1007 08/12/21 1007  ondansetron (ZOFRAN) injection 4 mg  Every 6 Hours PRN,   Status:  Discontinued      08/12/21 1007 08/12/21 1005  methylergonovine (METHERGINE) 0.2 MG/ML injection  - ADS Override Pull     Note to Pharmacy: Created by cabinet override    08/12/21 1005    08/12/21 0700  Sod Citrate-Citric Acid (BICITRA) solution 30 mL  Once,   Status:  Discontinued      08/12/21 0602    08/12/21 0700  fentaNYL 1 mcg/mL, Ropivacaine 0.2% in 200mL NS epidural  Continuous,   Status:  Discontinued      08/12/21  0602    08/12/21 0603  Vital Signs Per Anesthesia Guidelines  Per Hospital Policy,   Status:  Canceled     Comments: Every 3 Minutes x 20 Minutes following epidural dosing, then if stable every 15 Minutes    08/12/21 0602    08/12/21 0603  Start IV (16 or 18 Gauge)  Once,   Status:  Canceled      08/12/21 0602    08/12/21 0603  Fetal Heart Rate Monitor  Once,   Status:  Canceled      08/12/21 0602    08/12/21 0603  Nurse or Anesthesiologist to Remain With Patient for 15 Minutes Following Dosing  Continuous,   Status:  Canceled      08/12/21 0602    08/12/21 0603  Facilitate Maternal Position on Side & Maintain Uterine Displacement  Continuous,   Status:  Canceled      08/12/21 0602    08/12/21 0603  Consult Anesthesia Prior to Changing Epidural Infusion / Rate  Continuous,   Status:  Canceled      08/12/21 0602    08/12/21 0602  lactated ringers bolus 1,000 mL  As Needed,   Status:  Discontinued      08/12/21 0602    08/12/21 0602  ePHEDrine Sulfate 5 MG/ML injection 10 mg  Every 10 Minutes PRN,   Status:  Discontinued      08/12/21 0602    08/12/21 0602  diphenhydrAMINE (BENADRYL) injection 12.5 mg  Every 8 Hours PRN,   Status:  Discontinued      08/12/21 0602    08/12/21 0602  metoclopramide (REGLAN) injection 10 mg  Once As Needed,   Status:  Discontinued      08/12/21 0602    08/12/21 0602  ondansetron (ZOFRAN) injection 4 mg  Once As Needed,   Status:  Discontinued      08/12/21 0602    08/12/21 0602  famotidine (PEPCID) injection 20 mg  Once As Needed,   Status:  Discontinued      08/12/21 0602    08/12/21 0500  oxytocin in sodium chloride (PITOCIN) 30 UNIT/500ML infusion solution  Titrated,   Status:  Discontinued      08/11/21 2207 08/12/21 0000  Colmenares Bulb Cervical Ripening w/ infusion  Once,   Status:  Canceled     Comments: Have laborist place cervical Colmenares w/ infusion.  If unable to place Colmenares, start low dose Pitocin drip overnight then increase per protocol @@ 5 am.    08/11/21 1614    08/12/21  0000  GTT 1 Hour     Comments: This is an external result entered through the Results Console.      08/12/21 0749    08/11/21 2100  sodium chloride 0.9 % flush 3 mL  Every 12 Hours Scheduled,   Status:  Discontinued      08/11/21 1614    08/11/21 2000  POC Glucose Q4H  Every 4 Hours,   Status:  Canceled     Comments: If patient is gestational diabetic      08/11/21 1614    08/11/21 1810  ePHEDrine Sulfate 5 MG/ML injection  - ADS Override Pull  Status:  Discontinued     Note to Pharmacy: Created by cabinet override    08/11/21 1810    08/11/21 1810  erythromycin (ROMYCIN) 5 MG/GM ophthalmic ointment  - ADS Override Pull  Status:  Discontinued     Note to Pharmacy: Created by cabinet override    08/11/21 1810 08/11/21 1700  lactated ringers bolus 1,000 mL  Once      08/11/21 1614 08/11/21 1700  lactated ringers infusion  Continuous,   Status:  Discontinued      08/11/21 1614 08/11/21 1700  mineral oil liquid 30 mL  Once,   Status:  Discontinued      08/11/21 1614 08/11/21 1700  clindamycin (CLEOCIN) 900 mg in dextrose 5% 50 mL IVPB (premix)  Every 8 Hours,   Status:  Discontinued      08/11/21 1614 08/11/21 1614  Admit To Obstetrics Inpatient  Once      08/11/21 1614    08/11/21 1614  Obtain informed consent  Once      08/11/21 1614    08/11/21 1614  VTE Prophylaxis Not Indicated: No Risk Factors (0); </= 3 (Low Risk)  Once      08/11/21 1614    08/11/21 1614  Vital Signs Per hospital policy  Per Hospital Policy,   Status:  Canceled      08/11/21 1614    08/11/21 1614  Continuous Fetal Monitoring With NST on Admission and Prior to Initiation of Oxytocin.  Per Order Details,   Status:  Canceled     Comments: Continuous Fetal Monitoring With NST on Admission & Prior to Initiation of Oxytocin.    08/11/21 1614    08/11/21 1614  External Uterine Contraction Monitoring  Per Hospital Policy,   Status:  Canceled      08/11/21 1614    08/11/21 1614  Notify Physician (specified)  Until Discontinued,   Status:   Canceled      21 1614    21 1614  Notify physician for tachysystole (per hospital algorithm)  Until Discontinued,   Status:  Canceled      21 1614    21 1614  Notify physician if membranes ruptured, bleeding greater than 1 pad an hour, fetal heart tone abnormality, and severe pain  Until Discontinued,   Status:  Canceled      21 1614    21 1614  Up Ad Latonia  Until Discontinued,   Status:  Canceled      21 1614    21 1614  Cervical Exam  Once     Comments: Unless contraindicated, every 1-2 hours in active labor, or at nurse's discretion.    21 16121 161  Initiate Group Beta Strep (GBS) Prophylaxis Protocol, If Criteria Met  Continuous,   Status:  Canceled     Comments: NO TREATMENT RECOMMENDED IF: 1)  Maternal GBS status known negative 2)  Scheduled  birth with intact membranes, not in labor.  3 ) Maternal GBS unknown, no risk factors.   TREAT WITH ANTIBIOTICS IF:  1)  Maternal GBS status is known postive.  2)  Maternal GBS status unknown with these risk factors:  a)  Previous infant affected by GBS infection.  b)  GBS urinary tract infection (UTI) or bacteruria during pregnancy  c)  Unexplained maternal fever in labor (greater than or equal to 100.4F or 38.0C)  d)  Prolonged rupture of the membranes greater than or equal to 18 hours.  e)  Gestational age less than 37 weeks.    21 16121 161  Bedside ultrasound for fetal presentation  Once,   Status:  Canceled      21 1614    21 1614  Diet Clear Liquid  Diet Effective Now,   Status:  Canceled      214    21 1614  Insert Peripheral IV  Once      21 161  Saline Lock & Maintain IV Access  Continuous,   Status:  Canceled      21 1613  terbutaline (BRETHINE) injection 0.25 mg  As Needed,   Status:  Discontinued      21 1613  Position change  As Needed,   Status:  Canceled     Comments:  For intra-uterine resuscitation for hypertonus, hypertstimulation, or non-reassuring fetal status    08/11/21 1614    08/11/21 1613  POC Protein, Urine, Qualitative, Dipstick  As Needed,   Status:  Canceled     Comments: Diastolic BP Greater Than 90      08/11/21 1614    08/11/21 1613  lidocaine PF 1% (XYLOCAINE) injection 5 mL  As Needed,   Status:  Discontinued      08/11/21 1614    08/11/21 1613  sodium chloride 0.9 % flush 3-10 mL  As Needed,   Status:  Discontinued      08/11/21 1614    08/11/21 1613  butorphanol (STADOL) injection 1 mg  Every 2 Hours PRN,   Status:  Discontinued      08/11/21 1614    08/11/21 1613  butorphanol (STADOL) injection 2 mg  Every 2 Hours PRN,   Status:  Discontinued      08/11/21 1614    08/11/21 1613  ondansetron (ZOFRAN) tablet 4 mg  Every 6 Hours PRN,   Status:  Discontinued      08/11/21 1614    08/11/21 1613  ondansetron (ZOFRAN) injection 4 mg  Every 6 Hours PRN,   Status:  Discontinued      08/11/21 1614    08/11/21 1510  COVID PRE-OP / PRE-PROCEDURE SCREENING ORDER (NO ISOLATION) - Swab, Nasopharynx  Once      08/11/21 1509    08/11/21 1510  COVID-19 ABBOTT IN-HOUSE,NASAL Swab (NO TRANSPORT MEDIA) 2 HR TAT - Swab, Nasopharynx  PROCEDURE ONCE      08/11/21 1509    08/11/21 1433  CBC (No Diff)  STAT      08/11/21 1433    08/11/21 1433  Type & Screen  STAT      08/11/21 1433    08/11/21 1433  Preeclampsia Panel  STAT      08/11/21 1433    08/11/21 0000  Group B Streptococcus Culture - Swab, Vaginal/Rectum     Comments: This is an external result entered through the Results Console.      08/11/21 1733    Unscheduled  Apply Ice to Perineum  As Needed     Comments: For 20 min q 2 hrs    08/12/21 1506    Unscheduled  Waffle Cushion  As Needed     Comments: For perineal discomfort    08/12/21 1506    Unscheduled  Donut Ring  As Needed     Comments: For perineal pain    08/12/21 1506    Unscheduled  Kpad  As Needed     Comments: For pain    08/12/21 1506    Unscheduled  Warm  "compress  As Needed      08/12/21 1506    Unscheduled  Apply ace wrap, tight bra, or binder  As Needed      08/12/21 1506    Unscheduled  Apply ice packs  As Needed      08/12/21 1506    --  Prenatal Vit-Fe Fumarate-FA (Prenatal 27-1) 27-1 MG tablet tablet  Daily      08/11/21 1419    --  ferrous sulfate 324 (65 Fe) MG tablet delayed-release EC tablet  Daily With Breakfast      08/11/21 1419    --  EPINEPHrine (EPIPEN) 0.3 MG/0.3ML solution auto-injector injection  Status:  Discontinued      08/11/21 1859    --  SCANNED - LABS      08/11/21 0000                 Operative/Procedure Notes (last 48 hours) (Notes from 08/11/21 0709 through 08/13/21 0709)      Sameer Edmonds III, MD at 08/11/21 1709      Pre-procedure Diagnoses    1. Unfavorable cervix in term pregnancy [O34.40]    2. Gestational hypertension, third trimester [O13.3]           Post-procedure Diagnoses    1. Unfavorable cervix in term pregnancy [O34.40]    2. Gestational hypertension, third trimester [O13.3]           Procedures    1. INSERTION OF CERVICAL DILATOR [OBO3 (Custom)]             Patient admitted for induction of labor at 37 weeks 5 days gestation secondary to gestational hypertension.  Received request for placement of transcervical Colmenares bulb for cervical ripening.  Cervix 1 cm / 60%/-3 station (cervix mid position and medium texture).  Cephalic presentation confirmed by bedside ultrasound.  Transcervical Colmenares placed per physician request.  FHT's class 1.  Patient tolerated well.  24 french, 60ml.    Sameer Pond \"Tamara\" RJ Edmonds MD  8/11/2021  17:11 EDT          Electronically signed by Sameer Edmonds III, MD at 08/11/21 1711     Jennifer Mitchell CNM at 08/12/21 1252          [facility]  Vaginal Delivery Note    Delivery     Delivery: Vaginal, Spontaneous  Z3A.37   YOB: 2020    Time of Birth: 9:57 AM      Anesthesia: Epidural     Delivering clinician: Jennifer Mitchell    Forceps?   No   Vacuum? No  "   Shoulder dystocia present: No        Delivery narrative:  Patient pushed to deliver a male infant over a second degree laceration through a double nuchal cord. Spontaneous and vigorous cry. Infant was unwrapped from cord and placed on maternal abdomen where he was dried.  The cord was milked x4 then doubly clamped and cut. The placenta delivered spontaneously and visually intact.  Uterine atony was noted.  IV not infusing.  IM methergine administered. Uterine firmed with further massage. The perineum and vagina were inspected for lacerations.  Dr. Edmonds was consulted for assistance with repair.  A horizontal, JASMINA PUL was repaired using 3-0 vicryl.  A left sulcus laceration which extended horizontally to the right was repaired with 2-0 and 3-0 rapide. A second degree laceration was repaired using 2-0 and 3-0 rapide.  All counts were correct. Mom and baby entered recovery in stable condition.     Infant    Findings: male  infant, Arnoldo Masoud     Infant observations: Weight: 3650 g (8 lb 0.8 oz)   Length: 20.25  in  Observations/Comments:        Apgars: 8  @ 1 minute /    9  @ 5 minutes         Placenta, Cord, and Fluid    Placenta delivered  Spontaneous  at  8/12/2021 10:02 AM     Cord: 3 vessels  present.   Nuchal Cord?  yes; Number of nuchal loops present:  2    Cord blood obtained: Yes    Cord gases obtained:  No      Repair    Episiotomy: No   Lacerations: Yes  Laceration Information  Laceration Repaired?   Perineal: 2nd  Yes    Periurethral:   Yes    Labial:       Sulcus:   Yes    Vaginal:       Cervical:            Estimated Blood Loss:   400 mls.   Suture used for repair: 2-0 and 3-0 Vicryl         Complications  none    Disposition  Mother to Mother Baby/Postpartum  in stable condition currently.  Baby to remains with mom  in stable condition currently.      Jennifer Mitchell CNM  08/12/21  12:52 EDT        Electronically signed by Jennifer Mitchell CNM at 08/12/21 1312         Physician Progress  Notes (last 48 hours) (Notes from 08/11/21 0709 through 08/13/21 0709)    No notes of this type exist for this encounter.

## 2021-08-13 NOTE — ANESTHESIA POSTPROCEDURE EVALUATION
Patient: Marely Mcgee    Procedure Summary     Date: 08/12/21 Room / Location:     Anesthesia Start: 0607 Anesthesia Stop: 1002    Procedure: LABOR ANALGESIA Diagnosis:     Scheduled Providers:  Provider: Mason Camacho DO    Anesthesia Type: epidural ASA Status: 2          Anesthesia Type: epidural    Vitals  Vitals Value Taken Time   /57 08/13/21 0800   Temp 97.8 °F (36.6 °C) 08/13/21 0800   Pulse 125 08/13/21 0800   Resp 20 08/13/21 0800   SpO2 99 % 08/12/21 1200   Vitals shown include unvalidated device data.        Post Anesthesia Care and Evaluation    Patient location during evaluation: bedside  Patient participation: complete - patient participated  Level of consciousness: awake and alert  Pain management: adequate  Airway patency: patent  Anesthetic complications: No anesthetic complications    Cardiovascular status: acceptable  Respiratory status: acceptable  Hydration status: acceptable  Post Neuraxial Block status: Motor and sensory function returned to baseline and No signs or symptoms of PDPH

## 2021-08-14 VITALS
RESPIRATION RATE: 18 BRPM | HEART RATE: 106 BPM | BODY MASS INDEX: 33.49 KG/M2 | HEIGHT: 65 IN | OXYGEN SATURATION: 98 % | WEIGHT: 201 LBS | DIASTOLIC BLOOD PRESSURE: 70 MMHG | SYSTOLIC BLOOD PRESSURE: 125 MMHG | TEMPERATURE: 97.7 F

## 2021-08-14 RX ORDER — IBUPROFEN 600 MG/1
600 TABLET ORAL EVERY 6 HOURS PRN
Qty: 60 TABLET | Refills: 1 | Status: SHIPPED | OUTPATIENT
Start: 2021-08-14

## 2021-08-14 RX ADMIN — IBUPROFEN 600 MG: 600 TABLET, FILM COATED ORAL at 04:35

## 2021-08-14 RX ADMIN — DOCUSATE SODIUM 100 MG: 100 CAPSULE, LIQUID FILLED ORAL at 10:09

## 2021-08-14 NOTE — DISCHARGE SUMMARY
[facility]  Delivery Discharge Summary    Primary OB Clinician:     EDC: Estimated Date of Delivery: 21    Gestational Age:37w6d    Date of Admission: 2021    Admission Diagnosis:      Discharge Diagnosis: Same    Date of Delivery: 2021   Time of Delivery: 9:57 AM     Delivered By:  Jennifer Mitchell     Delivery Type: Vaginal, Spontaneous          Baby:male  infant;   Apgar:  8  @ 1 minute /   Apgar:  9  @ 5 minutes   Weight: 3650 g (8 lb 0.8 oz)    Length: 20.25     Anesthesia: Epidural      Laceration: Yes  Laceration Information  Laceration Repaired?   Perineal: 2nd  Yes    Periurethral:   Yes    Labial:       Sulcus:   Yes    Vaginal:       Cervical:         Suture used for repair: 2-0 vicryl and 3-0 vicryl. 3-0 vicryl rapide    Exam:  Normal postpartum exam    Hospital Course:  Hospital course has been stable.  Patient is tolerating po, voiding without difficulty and ready for discharge.  Please see medication reconciliation and lab report for additional details.      Follow Up:  Patient has been given a follow up appointment in our office.     Discharge Date: 2021; Discharge Time: 10:35 EDT    Kika Kingsley CNM   10:34 EDT   21

## 2021-08-14 NOTE — PROGRESS NOTES
[facility]  Vaginal Delivery Progress Note    Subjective     Doing well, pain controlled, lochia less than menses. Tolerating regular diet. Voiding without difficulty. Pain controlled. VSS. Mild tachycardia at times likely due to anemia. Denies SOB, CP, palpitations or near syncope. Afebrile Desires discharge home today.       Objective     Vital Signs Range for the last 24 hours  Temperature: Temp:  [97.7 °F (36.5 °C)-98 °F (36.7 °C)] 97.7 °F (36.5 °C)   Temp Source: Temp src: Oral   BP: BP: (118-125)/(56-70) 125/70   Pulse: Heart Rate:  [106-118] 106   Respirations: Resp:  [18] 18   SPO2:     O2 Amount (l/min):     O2 Devices           Physical Exam:  General:  no acute distresss.  Abdomen: Soft, non-tender, fundus firm  Extremities: normal, atraumatic, no cyanosis, and trace edema.       Lab results reviewed:  Yes    Lab Results   Component Value Date    WBC 14.72 (H) 2021    HGB 7.9 (L) 2021    HCT 24.3 (L) 2021    MCV 85.9 2021     2021         Assessment/Plan        (normal spontaneous vaginal delivery)      Marely Mcgee is Day 2  post-partum       Plan:  Discharge home with standard precautions and return to clinic in 4-6 weeks. Continue home iron supplement and stool softener x 4-6 weeks..      Kika Kingsley CNM  2021  10:29 EDT

## 2021-08-16 NOTE — PAYOR COMM NOTE
"Juan Flores (26 y.o. Female)     Discharged 8/14/21 with Baby.    From:Andria Watson  #[phone]  Fax#[phone]      Date of Birth Social Security Number Address Home Phone MRN    [id number]  [id number] [place] [address] [phone] [id number]    Protestant Marital Status          Religious        Admission Date Admission Type Admitting Provider Attending Provider Department, Room/Bed    8/11/21 Elective Nadira Power MD  [facility] MOTHER BABY 4A, N420/1    Discharge Date Discharge Disposition Discharge Destination        8/14/2021 Home or Self Care              Attending Provider: (none)   Allergies: Peanut-containing Drug Products    Isolation: None   Infection: None   Code Status: Prior    Ht: 165.1 cm (65\")   Wt: 91.2 kg (201 lb)    Admission Cmt: None   Principal Problem: None                Active Insurance as of 8/11/2021     Primary Coverage     Payor Plan Insurance Group Employer/Plan Group    [employer]HEM MEDICAID ANTH MEDICAID [id number]     Payor Plan Address Payor Plan Phone Number Payor Plan Fax Number Effective Dates    [address] [address] [phone]  1/1/2021 - None Entered    [facility] [id number]       Subscriber Name Subscriber Birth Date Member ID       JUAN FLORES 1995 [id number]                 Emergency Contacts      (Rel.) Home Phone Work Phone Mobile Phone    KANWAL FLORES (Spouse) -- -- [phone]               Discharge Summary      Kika Kingsley CNM at 08/14/21 1033     Attestation signed by Batsheva Krause DO at 08/14/21 1101    I have reviewed this documentation and agree.                    [facility]  Delivery Discharge Summary    Primary OB Clinician:     EDC: Estimated Date of Delivery: 8/27/21    Gestational Age:37w6d    Date of Admission: 8/11/2021    Admission Diagnosis:      Discharge Diagnosis: Same    Date of Delivery: 8/12/2021   Time of Delivery: 9:57 AM     Delivered By:  Jennifer" Borjas Stephen     Delivery Type: Vaginal, Spontaneous          Baby:male  infant;   Apgar:  8  @ 1 minute /   Apgar:  9  @ 5 minutes   Weight: 3650 g (8 lb 0.8 oz)    Length: 20.25     Anesthesia: Epidural      Laceration: Yes  Laceration Information  Laceration Repaired?   Perineal: 2nd  Yes    Periurethral:   Yes    Labial:       Sulcus:   Yes    Vaginal:       Cervical:         Suture used for repair: 2-0 vicryl and 3-0 vicryl. 3-0 vicryl rapide    Exam:  Normal postpartum exam    Hospital Course:  Hospital course has been stable.  Patient is tolerating po, voiding without difficulty and ready for discharge.  Please see medication reconciliation and lab report for additional details.      Follow Up:  Patient has been given a follow up appointment in our office.     Discharge Date: 2021; Discharge Time: 10:35 EDT    Kika Kingsley CNM   10:34 EDT   21         Electronically signed by Batsheva Krause DO at 21 1107

## 2021-08-22 ENCOUNTER — APPOINTMENT (OUTPATIENT)
Dept: PREADMISSION TESTING | Facility: HOSPITAL | Age: 26
End: 2021-08-22

## 2023-12-12 ENCOUNTER — TRANSCRIBE ORDERS (OUTPATIENT)
Dept: ADMINISTRATIVE | Facility: HOSPITAL | Age: 28
End: 2023-12-12
Payer: MEDICAID

## 2023-12-12 ENCOUNTER — LAB (OUTPATIENT)
Dept: LAB | Facility: HOSPITAL | Age: 28
End: 2023-12-12
Payer: MEDICAID

## 2023-12-12 DIAGNOSIS — N92.6 IRREGULAR MENSTRUAL CYCLE: ICD-10-CM

## 2023-12-12 DIAGNOSIS — N92.6 IRREGULAR MENSTRUAL CYCLE: Primary | ICD-10-CM

## 2023-12-12 LAB
ABO GROUP BLD: NORMAL
AMPHET+METHAMPHET UR QL: NEGATIVE
AMPHETAMINES UR QL: NEGATIVE
BARBITURATES UR QL SCN: NEGATIVE
BASOPHILS # BLD AUTO: 0.02 10*3/MM3 (ref 0–0.2)
BASOPHILS NFR BLD AUTO: 0.2 % (ref 0–1.5)
BENZODIAZ UR QL SCN: NEGATIVE
BLD GP AB SCN SERPL QL: NEGATIVE
BUPRENORPHINE SERPL-MCNC: NEGATIVE NG/ML
CANNABINOIDS SERPL QL: NEGATIVE
COCAINE UR QL: NEGATIVE
DEPRECATED RDW RBC AUTO: 37.1 FL (ref 37–54)
EOSINOPHIL # BLD AUTO: 0.1 10*3/MM3 (ref 0–0.4)
EOSINOPHIL NFR BLD AUTO: 1.2 % (ref 0.3–6.2)
ERYTHROCYTE [DISTWIDTH] IN BLOOD BY AUTOMATED COUNT: 12.8 % (ref 12.3–15.4)
FENTANYL UR-MCNC: NEGATIVE NG/ML
GLUCOSE SERPL-MCNC: 80 MG/DL (ref 65–99)
HBV SURFACE AG SERPL QL IA: NORMAL
HCT VFR BLD AUTO: 34.4 % (ref 34–46.6)
HGB BLD-MCNC: 11.2 G/DL (ref 12–15.9)
HIV 1+2 AB+HIV1 P24 AG SERPL QL IA: NORMAL
IMM GRANULOCYTES # BLD AUTO: 0.06 10*3/MM3 (ref 0–0.05)
IMM GRANULOCYTES NFR BLD AUTO: 0.7 % (ref 0–0.5)
LYMPHOCYTES # BLD AUTO: 1.83 10*3/MM3 (ref 0.7–3.1)
LYMPHOCYTES NFR BLD AUTO: 21.4 % (ref 19.6–45.3)
MCH RBC QN AUTO: 26.5 PG (ref 26.6–33)
MCHC RBC AUTO-ENTMCNC: 32.6 G/DL (ref 31.5–35.7)
MCV RBC AUTO: 81.3 FL (ref 79–97)
METHADONE UR QL SCN: NEGATIVE
MONOCYTES # BLD AUTO: 0.43 10*3/MM3 (ref 0.1–0.9)
MONOCYTES NFR BLD AUTO: 5 % (ref 5–12)
NEUTROPHILS NFR BLD AUTO: 6.13 10*3/MM3 (ref 1.7–7)
NEUTROPHILS NFR BLD AUTO: 71.5 % (ref 42.7–76)
NRBC BLD AUTO-RTO: 0 /100 WBC (ref 0–0.2)
OPIATES UR QL: NEGATIVE
OXYCODONE UR QL SCN: NEGATIVE
PCP UR QL SCN: NEGATIVE
PLATELET # BLD AUTO: 244 10*3/MM3 (ref 140–450)
PMV BLD AUTO: 11.1 FL (ref 6–12)
RBC # BLD AUTO: 4.23 10*6/MM3 (ref 3.77–5.28)
RH BLD: POSITIVE
T&S EXPIRATION DATE: NORMAL
TRICYCLICS UR QL SCN: NEGATIVE
WBC NRBC COR # BLD AUTO: 8.57 10*3/MM3 (ref 3.4–10.8)

## 2023-12-12 PROCEDURE — 80081 OBSTETRIC PANEL INC HIV TSTG: CPT

## 2023-12-12 PROCEDURE — 86803 HEPATITIS C AB TEST: CPT

## 2023-12-12 PROCEDURE — 36415 COLL VENOUS BLD VENIPUNCTURE: CPT

## 2023-12-12 PROCEDURE — 82947 ASSAY GLUCOSE BLOOD QUANT: CPT

## 2023-12-12 PROCEDURE — 87086 URINE CULTURE/COLONY COUNT: CPT

## 2023-12-12 PROCEDURE — 80307 DRUG TEST PRSMV CHEM ANLYZR: CPT

## 2023-12-13 LAB
BACTERIA SPEC AEROBE CULT: NO GROWTH
HCV AB SERPL QL IA: NORMAL
HCV IGG SERPL QL IA: NON REACTIVE
RPR SER QL: NORMAL
RUBV IGG SERPL IA-ACNC: 5.41 INDEX

## 2024-01-10 ENCOUNTER — TRANSCRIBE ORDERS (OUTPATIENT)
Dept: LAB | Facility: HOSPITAL | Age: 29
End: 2024-01-10
Payer: MEDICAID

## 2024-01-10 ENCOUNTER — LAB (OUTPATIENT)
Dept: LAB | Facility: HOSPITAL | Age: 29
End: 2024-01-10
Payer: MEDICAID

## 2024-01-10 DIAGNOSIS — Z3A.28 28 WEEKS GESTATION OF PREGNANCY: ICD-10-CM

## 2024-01-10 DIAGNOSIS — Z3A.28 28 WEEKS GESTATION OF PREGNANCY: Primary | ICD-10-CM

## 2024-01-10 DIAGNOSIS — Z34.83 PRENATAL CARE, SUBSEQUENT PREGNANCY, THIRD TRIMESTER: ICD-10-CM

## 2024-01-10 LAB
BLD GP AB SCN SERPL QL: NEGATIVE
GLUCOSE 1H P 100 G GLC PO SERPL-MCNC: 123 MG/DL (ref 65–139)

## 2024-01-10 PROCEDURE — 85027 COMPLETE CBC AUTOMATED: CPT

## 2024-01-10 PROCEDURE — 82950 GLUCOSE TEST: CPT

## 2024-01-10 PROCEDURE — 86850 RBC ANTIBODY SCREEN: CPT

## 2024-01-10 PROCEDURE — 36415 COLL VENOUS BLD VENIPUNCTURE: CPT

## 2024-01-10 PROCEDURE — 82948 REAGENT STRIP/BLOOD GLUCOSE: CPT | Performed by: OBSTETRICS & GYNECOLOGY

## 2024-01-11 LAB
DEPRECATED RDW RBC AUTO: 37.9 FL (ref 37–54)
ERYTHROCYTE [DISTWIDTH] IN BLOOD BY AUTOMATED COUNT: 12.6 % (ref 12.3–15.4)
HCT VFR BLD AUTO: 32.9 % (ref 34–46.6)
HGB BLD-MCNC: 10.9 G/DL (ref 12–15.9)
MCH RBC QN AUTO: 27.7 PG (ref 26.6–33)
MCHC RBC AUTO-ENTMCNC: 33.1 G/DL (ref 31.5–35.7)
MCV RBC AUTO: 83.5 FL (ref 79–97)
PLATELET # BLD AUTO: 286 10*3/MM3 (ref 140–450)
PMV BLD AUTO: 11.3 FL (ref 6–12)
RBC # BLD AUTO: 3.94 10*6/MM3 (ref 3.77–5.28)
WBC NRBC COR # BLD AUTO: 9.46 10*3/MM3 (ref 3.4–10.8)

## 2024-03-18 ENCOUNTER — PREP FOR SURGERY (OUTPATIENT)
Dept: OTHER | Facility: HOSPITAL | Age: 29
End: 2024-03-18
Payer: MEDICAID

## 2024-03-18 DIAGNOSIS — Z3A.39 39 WEEKS GESTATION OF PREGNANCY: Primary | ICD-10-CM

## 2024-03-18 RX ORDER — SODIUM CHLORIDE 9 MG/ML
40 INJECTION, SOLUTION INTRAVENOUS AS NEEDED
OUTPATIENT
Start: 2024-03-18

## 2024-03-18 RX ORDER — OXYCODONE AND ACETAMINOPHEN 10; 325 MG/1; MG/1
2 TABLET ORAL EVERY 4 HOURS PRN
OUTPATIENT
Start: 2024-03-18 | End: 2024-03-28

## 2024-03-18 RX ORDER — IBUPROFEN 600 MG/1
600 TABLET ORAL EVERY 6 HOURS PRN
OUTPATIENT
Start: 2024-03-18

## 2024-03-18 RX ORDER — SODIUM CHLORIDE, SODIUM LACTATE, POTASSIUM CHLORIDE, CALCIUM CHLORIDE 600; 310; 30; 20 MG/100ML; MG/100ML; MG/100ML; MG/100ML
125 INJECTION, SOLUTION INTRAVENOUS CONTINUOUS
OUTPATIENT
Start: 2024-03-18

## 2024-03-18 RX ORDER — TERBUTALINE SULFATE 1 MG/ML
0.25 INJECTION, SOLUTION SUBCUTANEOUS AS NEEDED
OUTPATIENT
Start: 2024-03-18

## 2024-03-18 RX ORDER — MORPHINE SULFATE 1 MG/ML
1 INJECTION, SOLUTION EPIDURAL; INTRATHECAL; INTRAVENOUS EVERY 4 HOURS PRN
OUTPATIENT
Start: 2024-03-18 | End: 2024-03-23

## 2024-03-18 RX ORDER — OXYTOCIN/0.9 % SODIUM CHLORIDE 30/500 ML
2-20 PLASTIC BAG, INJECTION (ML) INTRAVENOUS
OUTPATIENT
Start: 2024-03-18

## 2024-03-18 RX ORDER — OXYTOCIN/0.9 % SODIUM CHLORIDE 30/500 ML
250 PLASTIC BAG, INJECTION (ML) INTRAVENOUS CONTINUOUS
OUTPATIENT
Start: 2024-03-18 | End: 2024-03-18

## 2024-03-18 RX ORDER — ACETAMINOPHEN 325 MG/1
650 TABLET ORAL EVERY 4 HOURS PRN
OUTPATIENT
Start: 2024-03-18

## 2024-03-18 RX ORDER — SODIUM CHLORIDE 0.9 % (FLUSH) 0.9 %
10 SYRINGE (ML) INJECTION EVERY 12 HOURS SCHEDULED
OUTPATIENT
Start: 2024-03-18

## 2024-03-18 RX ORDER — ONDANSETRON 4 MG/1
4 TABLET, ORALLY DISINTEGRATING ORAL EVERY 6 HOURS PRN
OUTPATIENT
Start: 2024-03-18

## 2024-03-18 RX ORDER — MORPHINE SULFATE 2 MG/ML
2 INJECTION, SOLUTION INTRAMUSCULAR; INTRAVENOUS EVERY 4 HOURS PRN
OUTPATIENT
Start: 2024-03-18 | End: 2024-03-23

## 2024-03-18 RX ORDER — CARBOPROST TROMETHAMINE 250 UG/ML
250 INJECTION, SOLUTION INTRAMUSCULAR AS NEEDED
OUTPATIENT
Start: 2024-03-18

## 2024-03-18 RX ORDER — OXYCODONE HYDROCHLORIDE AND ACETAMINOPHEN 5; 325 MG/1; MG/1
1 TABLET ORAL EVERY 4 HOURS PRN
OUTPATIENT
Start: 2024-03-18 | End: 2024-03-28

## 2024-03-18 RX ORDER — ONDANSETRON 2 MG/ML
4 INJECTION INTRAMUSCULAR; INTRAVENOUS EVERY 6 HOURS PRN
OUTPATIENT
Start: 2024-03-18

## 2024-03-18 RX ORDER — OXYTOCIN/0.9 % SODIUM CHLORIDE 30/500 ML
999 PLASTIC BAG, INJECTION (ML) INTRAVENOUS ONCE
OUTPATIENT
Start: 2024-03-18 | End: 2024-03-18

## 2024-03-18 RX ORDER — METHYLERGONOVINE MALEATE 0.2 MG/ML
200 INJECTION INTRAVENOUS ONCE AS NEEDED
OUTPATIENT
Start: 2024-03-18

## 2024-03-18 RX ORDER — MISOPROSTOL 200 UG/1
800 TABLET ORAL AS NEEDED
OUTPATIENT
Start: 2024-03-18

## 2024-03-18 RX ORDER — SODIUM CHLORIDE 0.9 % (FLUSH) 0.9 %
10 SYRINGE (ML) INJECTION AS NEEDED
OUTPATIENT
Start: 2024-03-18

## 2024-03-18 RX ORDER — LIDOCAINE HYDROCHLORIDE 10 MG/ML
0.5 INJECTION, SOLUTION EPIDURAL; INFILTRATION; INTRACAUDAL; PERINEURAL ONCE AS NEEDED
OUTPATIENT
Start: 2024-03-18

## 2024-03-18 RX ORDER — MAGNESIUM CARB/ALUMINUM HYDROX 105-160MG
30 TABLET,CHEWABLE ORAL ONCE
OUTPATIENT
Start: 2024-03-18 | End: 2024-03-18

## 2024-03-18 RX ORDER — NALOXONE HCL 0.4 MG/ML
0.4 VIAL (ML) INJECTION
OUTPATIENT
Start: 2024-03-18

## 2024-03-31 ENCOUNTER — HOSPITAL ENCOUNTER (OUTPATIENT)
Dept: LABOR AND DELIVERY | Facility: HOSPITAL | Age: 29
Discharge: HOME | End: 2024-03-31
Payer: MEDICAID

## 2024-03-31 ENCOUNTER — HOSPITAL ENCOUNTER (INPATIENT)
Facility: HOSPITAL | Age: 29
LOS: 3 days | Discharge: HOME | End: 2024-04-03
Attending: OBSTETRICS & GYNECOLOGY | Admitting: OBSTETRICS & GYNECOLOGY
Payer: MEDICAID

## 2024-03-31 DIAGNOSIS — Z3A.39 39 WEEKS GESTATION OF PREGNANCY: ICD-10-CM

## 2024-03-31 PROBLEM — Z34.90 TERM PREGNANCY: Status: ACTIVE | Noted: 2024-03-31

## 2024-03-31 LAB
ABO GROUP BLD: NORMAL
ALP SERPL-CCNC: 123 U/L (ref 39–117)
ALT SERPL W P-5'-P-CCNC: 7 U/L (ref 1–33)
AST SERPL-CCNC: 13 U/L (ref 1–32)
BILIRUB SERPL-MCNC: <0.2 MG/DL (ref 0–1.2)
BLD GP AB SCN SERPL QL: NEGATIVE
CREAT SERPL-MCNC: 0.61 MG/DL (ref 0.57–1)
DEPRECATED RDW RBC AUTO: 44.7 FL (ref 37–54)
ERYTHROCYTE [DISTWIDTH] IN BLOOD BY AUTOMATED COUNT: 15 % (ref 12.3–15.4)
HCT VFR BLD AUTO: 31.9 % (ref 34–46.6)
HGB BLD-MCNC: 10.3 G/DL (ref 12–15.9)
LDH SERPL-CCNC: 122 U/L (ref 135–214)
MCH RBC QN AUTO: 26.7 PG (ref 26.6–33)
MCHC RBC AUTO-ENTMCNC: 32.3 G/DL (ref 31.5–35.7)
MCV RBC AUTO: 82.6 FL (ref 79–97)
PLATELET # BLD AUTO: 210 10*3/MM3 (ref 140–450)
PMV BLD AUTO: 12.3 FL (ref 6–12)
RBC # BLD AUTO: 3.86 10*6/MM3 (ref 3.77–5.28)
RH BLD: POSITIVE
T&S EXPIRATION DATE: NORMAL
URATE SERPL-MCNC: 3.8 MG/DL (ref 2.4–5.7)
WBC NRBC COR # BLD AUTO: 9.98 10*3/MM3 (ref 3.4–10.8)

## 2024-03-31 PROCEDURE — 82247 BILIRUBIN TOTAL: CPT | Performed by: OBSTETRICS & GYNECOLOGY

## 2024-03-31 PROCEDURE — 85027 COMPLETE CBC AUTOMATED: CPT | Performed by: OBSTETRICS & GYNECOLOGY

## 2024-03-31 PROCEDURE — 84460 ALANINE AMINO (ALT) (SGPT): CPT | Performed by: OBSTETRICS & GYNECOLOGY

## 2024-03-31 PROCEDURE — 86850 RBC ANTIBODY SCREEN: CPT | Performed by: OBSTETRICS & GYNECOLOGY

## 2024-03-31 PROCEDURE — 86780 TREPONEMA PALLIDUM: CPT | Performed by: OBSTETRICS & GYNECOLOGY

## 2024-03-31 PROCEDURE — 83615 LACTATE (LD) (LDH) ENZYME: CPT | Performed by: OBSTETRICS & GYNECOLOGY

## 2024-03-31 PROCEDURE — 25810000003 LACTATED RINGERS PER 1000 ML: Performed by: OBSTETRICS & GYNECOLOGY

## 2024-03-31 PROCEDURE — 86901 BLOOD TYPING SEROLOGIC RH(D): CPT | Performed by: OBSTETRICS & GYNECOLOGY

## 2024-03-31 PROCEDURE — 84075 ASSAY ALKALINE PHOSPHATASE: CPT | Performed by: OBSTETRICS & GYNECOLOGY

## 2024-03-31 PROCEDURE — 84450 TRANSFERASE (AST) (SGOT): CPT | Performed by: OBSTETRICS & GYNECOLOGY

## 2024-03-31 PROCEDURE — 82565 ASSAY OF CREATININE: CPT | Performed by: OBSTETRICS & GYNECOLOGY

## 2024-03-31 PROCEDURE — 84550 ASSAY OF BLOOD/URIC ACID: CPT | Performed by: OBSTETRICS & GYNECOLOGY

## 2024-03-31 PROCEDURE — 86900 BLOOD TYPING SEROLOGIC ABO: CPT | Performed by: OBSTETRICS & GYNECOLOGY

## 2024-03-31 RX ORDER — ACETAMINOPHEN 325 MG/1
650 TABLET ORAL EVERY 4 HOURS PRN
Status: DISCONTINUED | OUTPATIENT
Start: 2024-03-31 | End: 2024-04-01 | Stop reason: HOSPADM

## 2024-03-31 RX ORDER — NALOXONE HCL 0.4 MG/ML
0.4 VIAL (ML) INJECTION
Status: DISCONTINUED | OUTPATIENT
Start: 2024-03-31 | End: 2024-04-01

## 2024-03-31 RX ORDER — LIDOCAINE HYDROCHLORIDE 10 MG/ML
0.5 INJECTION, SOLUTION EPIDURAL; INFILTRATION; INTRACAUDAL; PERINEURAL ONCE AS NEEDED
Status: DISCONTINUED | OUTPATIENT
Start: 2024-03-31 | End: 2024-04-01

## 2024-03-31 RX ORDER — OXYTOCIN/0.9 % SODIUM CHLORIDE 30/500 ML
2-20 PLASTIC BAG, INJECTION (ML) INTRAVENOUS
Status: DISCONTINUED | OUTPATIENT
Start: 2024-03-31 | End: 2024-04-01

## 2024-03-31 RX ORDER — TERBUTALINE SULFATE 1 MG/ML
0.25 INJECTION, SOLUTION SUBCUTANEOUS AS NEEDED
Status: DISCONTINUED | OUTPATIENT
Start: 2024-03-31 | End: 2024-04-01

## 2024-03-31 RX ORDER — SODIUM CHLORIDE 9 MG/ML
40 INJECTION, SOLUTION INTRAVENOUS AS NEEDED
Status: DISCONTINUED | OUTPATIENT
Start: 2024-03-31 | End: 2024-04-01

## 2024-03-31 RX ORDER — ONDANSETRON 4 MG/1
4 TABLET, ORALLY DISINTEGRATING ORAL EVERY 6 HOURS PRN
Status: DISCONTINUED | OUTPATIENT
Start: 2024-03-31 | End: 2024-04-01 | Stop reason: HOSPADM

## 2024-03-31 RX ORDER — SODIUM CHLORIDE 0.9 % (FLUSH) 0.9 %
10 SYRINGE (ML) INJECTION EVERY 12 HOURS SCHEDULED
Status: DISCONTINUED | OUTPATIENT
Start: 2024-03-31 | End: 2024-04-01

## 2024-03-31 RX ORDER — MORPHINE SULFATE 2 MG/ML
2 INJECTION, SOLUTION INTRAMUSCULAR; INTRAVENOUS EVERY 4 HOURS PRN
Status: DISCONTINUED | OUTPATIENT
Start: 2024-03-31 | End: 2024-04-01

## 2024-03-31 RX ORDER — ONDANSETRON 2 MG/ML
4 INJECTION INTRAMUSCULAR; INTRAVENOUS EVERY 6 HOURS PRN
Status: DISCONTINUED | OUTPATIENT
Start: 2024-03-31 | End: 2024-04-01 | Stop reason: HOSPADM

## 2024-03-31 RX ORDER — SODIUM CHLORIDE, SODIUM LACTATE, POTASSIUM CHLORIDE, CALCIUM CHLORIDE 600; 310; 30; 20 MG/100ML; MG/100ML; MG/100ML; MG/100ML
125 INJECTION, SOLUTION INTRAVENOUS CONTINUOUS
Status: DISCONTINUED | OUTPATIENT
Start: 2024-03-31 | End: 2024-04-01

## 2024-03-31 RX ORDER — MAGNESIUM CARB/ALUMINUM HYDROX 105-160MG
30 TABLET,CHEWABLE ORAL ONCE
Qty: 30 ML | Refills: 0 | Status: DISCONTINUED | OUTPATIENT
Start: 2024-03-31 | End: 2024-04-01

## 2024-03-31 RX ORDER — MORPHINE SULFATE 2 MG/ML
1 INJECTION, SOLUTION INTRAMUSCULAR; INTRAVENOUS EVERY 4 HOURS PRN
Status: DISCONTINUED | OUTPATIENT
Start: 2024-03-31 | End: 2024-04-01

## 2024-03-31 RX ORDER — SODIUM CHLORIDE 0.9 % (FLUSH) 0.9 %
10 SYRINGE (ML) INJECTION AS NEEDED
Status: DISCONTINUED | OUTPATIENT
Start: 2024-03-31 | End: 2024-04-01

## 2024-03-31 RX ADMIN — SODIUM CHLORIDE, POTASSIUM CHLORIDE, SODIUM LACTATE AND CALCIUM CHLORIDE 125 ML/HR: 600; 310; 30; 20 INJECTION, SOLUTION INTRAVENOUS at 21:48

## 2024-03-31 RX ADMIN — Medication 2 MILLI-UNITS/MIN: at 23:10

## 2024-04-01 ENCOUNTER — ANESTHESIA EVENT (OUTPATIENT)
Dept: LABOR AND DELIVERY | Facility: HOSPITAL | Age: 29
End: 2024-04-01
Payer: MEDICAID

## 2024-04-01 ENCOUNTER — ANESTHESIA (OUTPATIENT)
Dept: LABOR AND DELIVERY | Facility: HOSPITAL | Age: 29
End: 2024-04-01
Payer: MEDICAID

## 2024-04-01 LAB — T PALLIDUM IGG SER QL: NORMAL

## 2024-04-01 PROCEDURE — C1755 CATHETER, INTRASPINAL: HCPCS | Performed by: ANESTHESIOLOGY

## 2024-04-01 PROCEDURE — 25010000002 BUPIVACAINE (PF) 0.25 % SOLUTION: Performed by: ANESTHESIOLOGY

## 2024-04-01 PROCEDURE — 10907ZC DRAINAGE OF AMNIOTIC FLUID, THERAPEUTIC FROM PRODUCTS OF CONCEPTION, VIA NATURAL OR ARTIFICIAL OPENING: ICD-10-PCS | Performed by: OBSTETRICS & GYNECOLOGY

## 2024-04-01 PROCEDURE — 51701 INSERT BLADDER CATHETER: CPT

## 2024-04-01 PROCEDURE — C1755 CATHETER, INTRASPINAL: HCPCS

## 2024-04-01 PROCEDURE — 25810000003 LACTATED RINGERS SOLUTION: Performed by: OBSTETRICS & GYNECOLOGY

## 2024-04-01 PROCEDURE — 59025 FETAL NON-STRESS TEST: CPT

## 2024-04-01 PROCEDURE — 25010000002 FENTANYL CITRATE (PF) 50 MCG/ML SOLUTION: Performed by: ANESTHESIOLOGY

## 2024-04-01 PROCEDURE — 3E033VJ INTRODUCTION OF OTHER HORMONE INTO PERIPHERAL VEIN, PERCUTANEOUS APPROACH: ICD-10-PCS | Performed by: OBSTETRICS & GYNECOLOGY

## 2024-04-01 PROCEDURE — 3E0P7VZ INTRODUCTION OF HORMONE INTO FEMALE REPRODUCTIVE, VIA NATURAL OR ARTIFICIAL OPENING: ICD-10-PCS | Performed by: OBSTETRICS & GYNECOLOGY

## 2024-04-01 PROCEDURE — 0KQM0ZZ REPAIR PERINEUM MUSCLE, OPEN APPROACH: ICD-10-PCS | Performed by: OBSTETRICS & GYNECOLOGY

## 2024-04-01 PROCEDURE — 25010000002 METHYLERGONOVINE MALEATE PER 0.2 MG

## 2024-04-01 PROCEDURE — 25810000003 LACTATED RINGERS PER 1000 ML: Performed by: OBSTETRICS & GYNECOLOGY

## 2024-04-01 RX ORDER — ACETAMINOPHEN 325 MG/1
650 TABLET ORAL EVERY 4 HOURS PRN
Status: DISCONTINUED | OUTPATIENT
Start: 2024-04-01 | End: 2024-04-01 | Stop reason: HOSPADM

## 2024-04-01 RX ORDER — CITRIC ACID/SODIUM CITRATE 334-500MG
30 SOLUTION, ORAL ORAL ONCE
Status: DISCONTINUED | OUTPATIENT
Start: 2024-04-01 | End: 2024-04-01

## 2024-04-01 RX ORDER — OXYCODONE AND ACETAMINOPHEN 10; 325 MG/1; MG/1
2 TABLET ORAL EVERY 4 HOURS PRN
Status: DISCONTINUED | OUTPATIENT
Start: 2024-04-01 | End: 2024-04-01 | Stop reason: HOSPADM

## 2024-04-01 RX ORDER — ONDANSETRON 2 MG/ML
4 INJECTION INTRAMUSCULAR; INTRAVENOUS EVERY 6 HOURS PRN
Status: DISCONTINUED | OUTPATIENT
Start: 2024-04-01 | End: 2024-04-03 | Stop reason: HOSPADM

## 2024-04-01 RX ORDER — MISOPROSTOL 200 UG/1
800 TABLET ORAL AS NEEDED
Status: DISCONTINUED | OUTPATIENT
Start: 2024-04-01 | End: 2024-04-03 | Stop reason: HOSPADM

## 2024-04-01 RX ORDER — METHYLERGONOVINE MALEATE 0.2 MG/ML
200 INJECTION INTRAVENOUS ONCE AS NEEDED
Status: DISCONTINUED | OUTPATIENT
Start: 2024-04-01 | End: 2024-04-01 | Stop reason: HOSPADM

## 2024-04-01 RX ORDER — ONDANSETRON 2 MG/ML
4 INJECTION INTRAMUSCULAR; INTRAVENOUS EVERY 6 HOURS PRN
Status: DISCONTINUED | OUTPATIENT
Start: 2024-04-01 | End: 2024-04-01 | Stop reason: HOSPADM

## 2024-04-01 RX ORDER — METOCLOPRAMIDE HYDROCHLORIDE 5 MG/ML
10 INJECTION INTRAMUSCULAR; INTRAVENOUS ONCE AS NEEDED
Status: DISCONTINUED | OUTPATIENT
Start: 2024-04-01 | End: 2024-04-01

## 2024-04-01 RX ORDER — OXYTOCIN/0.9 % SODIUM CHLORIDE 30/500 ML
999 PLASTIC BAG, INJECTION (ML) INTRAVENOUS ONCE
Status: DISCONTINUED | OUTPATIENT
Start: 2024-04-01 | End: 2024-04-03 | Stop reason: HOSPADM

## 2024-04-01 RX ORDER — ONDANSETRON 2 MG/ML
4 INJECTION INTRAMUSCULAR; INTRAVENOUS ONCE AS NEEDED
Status: DISCONTINUED | OUTPATIENT
Start: 2024-04-01 | End: 2024-04-01

## 2024-04-01 RX ORDER — DIPHENHYDRAMINE HCL 25 MG
25 CAPSULE ORAL NIGHTLY PRN
Status: DISCONTINUED | OUTPATIENT
Start: 2024-04-01 | End: 2024-04-03 | Stop reason: HOSPADM

## 2024-04-01 RX ORDER — LIDOCAINE HYDROCHLORIDE AND EPINEPHRINE 15; 5 MG/ML; UG/ML
INJECTION, SOLUTION EPIDURAL AS NEEDED
Status: DISCONTINUED | OUTPATIENT
Start: 2024-04-01 | End: 2024-04-01 | Stop reason: SURG

## 2024-04-01 RX ORDER — OXYTOCIN/0.9 % SODIUM CHLORIDE 30/500 ML
250 PLASTIC BAG, INJECTION (ML) INTRAVENOUS CONTINUOUS
Status: ACTIVE | OUTPATIENT
Start: 2024-04-01 | End: 2024-04-01

## 2024-04-01 RX ORDER — CALCIUM CARBONATE 500 MG/1
1 TABLET, CHEWABLE ORAL 3 TIMES DAILY PRN
Status: DISCONTINUED | OUTPATIENT
Start: 2024-04-01 | End: 2024-04-03 | Stop reason: HOSPADM

## 2024-04-01 RX ORDER — PROMETHAZINE HYDROCHLORIDE 12.5 MG/1
12.5 TABLET ORAL EVERY 4 HOURS PRN
Status: DISCONTINUED | OUTPATIENT
Start: 2024-04-01 | End: 2024-04-03 | Stop reason: HOSPADM

## 2024-04-01 RX ORDER — FAMOTIDINE 10 MG/ML
20 INJECTION, SOLUTION INTRAVENOUS ONCE AS NEEDED
Status: DISCONTINUED | OUTPATIENT
Start: 2024-04-01 | End: 2024-04-01

## 2024-04-01 RX ORDER — CARBOPROST TROMETHAMINE 250 UG/ML
250 INJECTION, SOLUTION INTRAMUSCULAR AS NEEDED
Status: DISCONTINUED | OUTPATIENT
Start: 2024-04-01 | End: 2024-04-03 | Stop reason: HOSPADM

## 2024-04-01 RX ORDER — DIPHENHYDRAMINE HYDROCHLORIDE 50 MG/ML
12.5 INJECTION INTRAMUSCULAR; INTRAVENOUS EVERY 8 HOURS PRN
Status: DISCONTINUED | OUTPATIENT
Start: 2024-04-01 | End: 2024-04-01

## 2024-04-01 RX ORDER — DOCUSATE SODIUM 100 MG/1
100 CAPSULE, LIQUID FILLED ORAL 2 TIMES DAILY
Status: DISCONTINUED | OUTPATIENT
Start: 2024-04-01 | End: 2024-04-03 | Stop reason: HOSPADM

## 2024-04-01 RX ORDER — LIDOCAINE HCL/EPINEPHRINE/PF 2%-1:200K
VIAL (ML) INJECTION AS NEEDED
Status: DISCONTINUED | OUTPATIENT
Start: 2024-04-01 | End: 2024-04-01 | Stop reason: SURG

## 2024-04-01 RX ORDER — ONDANSETRON 4 MG/1
4 TABLET, ORALLY DISINTEGRATING ORAL EVERY 6 HOURS PRN
Status: DISCONTINUED | OUTPATIENT
Start: 2024-04-01 | End: 2024-04-01 | Stop reason: HOSPADM

## 2024-04-01 RX ORDER — IBUPROFEN 600 MG/1
600 TABLET ORAL EVERY 6 HOURS PRN
Status: DISCONTINUED | OUTPATIENT
Start: 2024-04-01 | End: 2024-04-01 | Stop reason: HOSPADM

## 2024-04-01 RX ORDER — HYDROCORTISONE 25 MG/G
1 CREAM TOPICAL AS NEEDED
Status: DISCONTINUED | OUTPATIENT
Start: 2024-04-01 | End: 2024-04-03 | Stop reason: HOSPADM

## 2024-04-01 RX ORDER — ACETAMINOPHEN 325 MG/1
650 TABLET ORAL EVERY 6 HOURS PRN
Status: DISCONTINUED | OUTPATIENT
Start: 2024-04-01 | End: 2024-04-03 | Stop reason: HOSPADM

## 2024-04-01 RX ORDER — OXYCODONE HYDROCHLORIDE AND ACETAMINOPHEN 5; 325 MG/1; MG/1
1 TABLET ORAL EVERY 4 HOURS PRN
Status: DISCONTINUED | OUTPATIENT
Start: 2024-04-01 | End: 2024-04-01 | Stop reason: HOSPADM

## 2024-04-01 RX ORDER — HYDROCODONE BITARTRATE AND ACETAMINOPHEN 10; 325 MG/1; MG/1
1 TABLET ORAL EVERY 4 HOURS PRN
Status: DISCONTINUED | OUTPATIENT
Start: 2024-04-01 | End: 2024-04-03 | Stop reason: HOSPADM

## 2024-04-01 RX ORDER — SIMETHICONE 80 MG
80 TABLET,CHEWABLE ORAL 4 TIMES DAILY PRN
Status: DISCONTINUED | OUTPATIENT
Start: 2024-04-01 | End: 2024-04-03 | Stop reason: HOSPADM

## 2024-04-01 RX ORDER — MISOPROSTOL 200 UG/1
800 TABLET ORAL AS NEEDED
Status: DISCONTINUED | OUTPATIENT
Start: 2024-04-01 | End: 2024-04-01 | Stop reason: HOSPADM

## 2024-04-01 RX ORDER — METHYLERGONOVINE MALEATE 0.2 MG/ML
INJECTION INTRAVENOUS
Status: COMPLETED
Start: 2024-04-01 | End: 2024-04-01

## 2024-04-01 RX ORDER — ROPIVACAINE HYDROCHLORIDE 2 MG/ML
15 INJECTION, SOLUTION EPIDURAL; INFILTRATION; PERINEURAL CONTINUOUS
Status: DISCONTINUED | OUTPATIENT
Start: 2024-04-01 | End: 2024-04-01

## 2024-04-01 RX ORDER — ONDANSETRON 4 MG/1
4 TABLET, ORALLY DISINTEGRATING ORAL EVERY 8 HOURS PRN
Status: DISCONTINUED | OUTPATIENT
Start: 2024-04-01 | End: 2024-04-03 | Stop reason: HOSPADM

## 2024-04-01 RX ORDER — EPHEDRINE SULFATE 5 MG/ML
10 INJECTION INTRAVENOUS
Status: DISCONTINUED | OUTPATIENT
Start: 2024-04-01 | End: 2024-04-01

## 2024-04-01 RX ORDER — HYDROCODONE BITARTRATE AND ACETAMINOPHEN 5; 325 MG/1; MG/1
1 TABLET ORAL EVERY 4 HOURS PRN
Status: DISCONTINUED | OUTPATIENT
Start: 2024-04-01 | End: 2024-04-03 | Stop reason: HOSPADM

## 2024-04-01 RX ORDER — CARBOPROST TROMETHAMINE 250 UG/ML
250 INJECTION, SOLUTION INTRAMUSCULAR AS NEEDED
Status: DISCONTINUED | OUTPATIENT
Start: 2024-04-01 | End: 2024-04-01 | Stop reason: HOSPADM

## 2024-04-01 RX ORDER — SODIUM CHLORIDE 0.9 % (FLUSH) 0.9 %
1-10 SYRINGE (ML) INJECTION AS NEEDED
Status: DISCONTINUED | OUTPATIENT
Start: 2024-04-01 | End: 2024-04-03 | Stop reason: HOSPADM

## 2024-04-01 RX ORDER — PRENATAL VIT/IRON FUM/FOLIC AC 27MG-0.8MG
1 TABLET ORAL DAILY
Status: DISCONTINUED | OUTPATIENT
Start: 2024-04-01 | End: 2024-04-03 | Stop reason: HOSPADM

## 2024-04-01 RX ORDER — BUPIVACAINE HYDROCHLORIDE 2.5 MG/ML
INJECTION, SOLUTION EPIDURAL; INFILTRATION; INTRACAUDAL AS NEEDED
Status: DISCONTINUED | OUTPATIENT
Start: 2024-04-01 | End: 2024-04-01 | Stop reason: SURG

## 2024-04-01 RX ORDER — METHYLERGONOVINE MALEATE 0.2 MG/ML
200 INJECTION INTRAVENOUS ONCE AS NEEDED
Status: DISCONTINUED | OUTPATIENT
Start: 2024-04-01 | End: 2024-04-03 | Stop reason: HOSPADM

## 2024-04-01 RX ORDER — OXYTOCIN/0.9 % SODIUM CHLORIDE 30/500 ML
125 PLASTIC BAG, INJECTION (ML) INTRAVENOUS ONCE AS NEEDED
Status: DISCONTINUED | OUTPATIENT
Start: 2024-04-01 | End: 2024-04-03 | Stop reason: HOSPADM

## 2024-04-01 RX ORDER — FENTANYL CITRATE 50 UG/ML
INJECTION, SOLUTION INTRAMUSCULAR; INTRAVENOUS AS NEEDED
Status: DISCONTINUED | OUTPATIENT
Start: 2024-04-01 | End: 2024-04-01 | Stop reason: SURG

## 2024-04-01 RX ORDER — IBUPROFEN 600 MG/1
600 TABLET ORAL EVERY 6 HOURS PRN
Status: DISCONTINUED | OUTPATIENT
Start: 2024-04-01 | End: 2024-04-03 | Stop reason: HOSPADM

## 2024-04-01 RX ADMIN — SODIUM CHLORIDE, POTASSIUM CHLORIDE, SODIUM LACTATE AND CALCIUM CHLORIDE 1000 ML: 600; 310; 30; 20 INJECTION, SOLUTION INTRAVENOUS at 08:39

## 2024-04-01 RX ADMIN — LIDOCAINE HYDROCHLORIDE AND EPINEPHRINE 3 ML: 15; 5 INJECTION, SOLUTION EPIDURAL at 08:51

## 2024-04-01 RX ADMIN — PRENATAL VITAMINS-IRON FUMARATE 27 MG IRON-FOLIC ACID 0.8 MG TABLET 1 TABLET: at 12:58

## 2024-04-01 RX ADMIN — LIDOCAINE HYDROCHLORIDE AND EPINEPHRINE 2 ML: 15; 5 INJECTION, SOLUTION EPIDURAL at 08:52

## 2024-04-01 RX ADMIN — METHYLERGONOVINE MALEATE 200 MCG: 0.2 INJECTION, SOLUTION INTRAMUSCULAR; INTRAVENOUS at 09:17

## 2024-04-01 RX ADMIN — IBUPROFEN 600 MG: 600 TABLET ORAL at 21:27

## 2024-04-01 RX ADMIN — Medication 1 APPLICATION: at 12:58

## 2024-04-01 RX ADMIN — DOCUSATE SODIUM 100 MG: 100 CAPSULE, LIQUID FILLED ORAL at 12:58

## 2024-04-01 RX ADMIN — BUPIVACAINE HYDROCHLORIDE 12 ML: 2.5 INJECTION, SOLUTION EPIDURAL; INFILTRATION; INTRACAUDAL; PERINEURAL at 08:53

## 2024-04-01 RX ADMIN — DOCUSATE SODIUM 100 MG: 100 CAPSULE, LIQUID FILLED ORAL at 21:27

## 2024-04-01 RX ADMIN — LIDOCAINE HYDROCHLORIDE AND EPINEPHRINE 8 ML: 20; 5 INJECTION, SOLUTION EPIDURAL; INFILTRATION; INTRACAUDAL; PERINEURAL at 08:57

## 2024-04-01 RX ADMIN — SODIUM CHLORIDE, POTASSIUM CHLORIDE, SODIUM LACTATE AND CALCIUM CHLORIDE 125 ML/HR: 600; 310; 30; 20 INJECTION, SOLUTION INTRAVENOUS at 04:00

## 2024-04-01 RX ADMIN — FENTANYL CITRATE 100 MCG: 50 INJECTION, SOLUTION INTRAMUSCULAR; INTRAVENOUS at 08:53

## 2024-04-01 RX ADMIN — WITCH HAZEL: 500 SOLUTION RECTAL; TOPICAL at 12:58

## 2024-04-01 RX ADMIN — Medication: at 12:58

## 2024-04-01 NOTE — PAYOR COMM NOTE
"Juan Flores (28 y.o. Female)     [place] Medicaid ID#[id number]     Delivery Information.    From:Andria Watson LPN, Utilization Review  Phone #[phone]  Fax #[phone]        Date of Birth   1995    Social Security Number       Address   [address] [place] [address]  [place] [address]    Home Phone   [phone]    MRN   [id number]       Nondenominational   Evangelical    Marital Status                               Admission Date   3/31/24    Admission Type   Elective    Admitting Provider   Sammi Hauser MD    Attending Provider   Sammi Hauser MD    Department, Room/Bed   [facility] MOTHER BABY 4A, N416/1       Discharge Date       Discharge Disposition       Discharge Destination                                 Attending Provider: Sammi Hauser MD    Allergies: Peanut-containing Drug Products    Isolation: None   Infection: None   Code Status: CPR    Ht: 165.1 cm (65\")   Wt: 88.5 kg (195 lb)    Admission Cmt: None   Principal Problem: None                  Active Insurance as of 3/31/2024       Primary Coverage       Payor Plan Insurance Group Employer/Plan Group    [facility] MEDICAID [facility] MEDICAID [id number]       Payor Plan Address Payor Plan Phone Number Payor Plan Fax Number Effective Dates    [address] [address] [phone]  1/1/2021 - None Entered    [facility] [id number]         Subscriber Name Subscriber Birth Date Member ID       JUAN FLORES 1995 [id number]                     Emergency Contacts        (Rel.) Home Phone Work Phone Mobile Phone    CYNTHIA FLORES (Spouse) -- -- [phone]              Insurance Information                  [employer] [employer] Phone: [phone]    Subscriber: Juan Flores Subscriber#: [id number]    Group#: [id number] Precert#: --             History & Physical        Sammi Hauser MD at 04/01/24 0813          [facility]  Obstetric History and Physical    Chief Complaint   Patient " "presents with    Scheduled Induction       Subjective    Patient is a 28 y.o. female  currently at 39w2d, who presented overnight for scheduled elective induction of labor. She is s/p FB overnight for cervical ripening.    Her prenatal care has been uncomplicated.  Her previous obstetric/gynecological history is noted and is remarkable for prior FTSVD x 1. She had a complicated perineal laceration during her last delivery which was repaired with vicryl. 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    The following portions of the patients history were reviewed and updated as appropriate: current medications, allergies, past medical history, past surgical history, past family history, past social history, and problem list .       Prenatal Information:   Maternal Prenatal Labs  Blood Type ABO Type   Date Value Ref Range Status   2024 A  Final      Rh Status RH type   Date Value Ref Range Status   2024 Positive  Final      Antibody Screen Antibody Screen   Date Value Ref Range Status   2024 Negative  Final      Gonnorhea No results found for: \"GCCX\"   Chlamydia No results found for: \"CLAMYDCU\"   RPR No results found for: \"RPR\"   Syphilis Antibody No results found for: \"SYPHILIS\"   Rubella No results found for: \"RUBELLAIGGIN\"   Hepatitis B Surface Antigen No results found for: \"HEPBSAG\"   HIV-1 Antibody No results found for: \"LABHIV1\"   Hepatitis C Antibody No results found for: \"HEPCAB\"   Rapid Urin Drug Screen No results found for: \"AMPMETHU\", \"BARBITSCNUR\", \"LABBENZSCN\", \"LABMETHSCN\", \"LABOPIASCN\", \"THCURSCR\", \"COCAINEUR\", \"AMPHETSCREEN\", \"PROPOXSCN\", \"BUPRENORSCNU\", \"METAMPSCNUR\", \"OXYCODONESCN\", \"TRICYCLICSCN\"   Group B Strep Culture No results found for: \"GBSANTIGEN\"           External Prenatal Results       Pregnancy Outside Results - Transcribed From Office Records - See Scanned Records For Details       Test Value Date Time    ABO  A  24    Rh  Positive  " 24    Antibody Screen  Negative  24       Negative  01/10/24 1022       Negative  23 1057    Varicella IgG       Rubella  5.41 index 23 1057    Hgb  10.3 g/dL 24       10.9 g/dL 01/10/24 1022       11.2 g/dL 23 1057    Hct  31.9 % 24       32.9 % 01/10/24 1022       34.4 % 23 1057    Glucose Fasting GTT       Glucose Tolerance Test 1 hour ^ 108  21     Glucose Tolerance Test 3 hour       Gonorrhea (discrete)       Chlamydia (discrete)       RPR  Non-Reactive  23 1057    VDRL       Syphilis Antibody       HBsAg  Non-Reactive  23 1057    Herpes Simplex Virus PCR       Herpes Simplex VIrus Culture       HIV  Non-Reactive  23 1057    Hep C RNA Quant PCR       Hep C Antibody  Non-Reactive  21 1324    AFP       Group B Strep ^ NEG  21     GBS Susceptibility to Clindamycin       GBS Susceptibility to Erythromycin       Fetal Fibronectin       Genetic Testing, Maternal Blood                 Drug Screening       Test Value Date Time    Urine Drug Screen       Amphetamine Screen  Negative  23 1057    Barbiturate Screen  Negative  23 1057    Benzodiazepine Screen  Negative  23 1057    Methadone Screen  Negative  23 1057    Phencyclidine Screen  Negative  23 1057    Opiates Screen  Negative  23 1057    THC Screen  Negative  23 1057    Cocaine Screen       Propoxyphene Screen  Negative  21 1221    Buprenorphine Screen  Negative  23 1057    Methamphetamine Screen       Oxycodone Screen  Negative  23 1057    Tricyclic Antidepressants Screen  Negative  23 1057              Legend    ^: Historical                              Past OB History:       OB History    Para Term  AB Living   2 1 1 0 0 1   SAB IAB Ectopic Molar Multiple Live Births   0 0 0 0 0 1      # Outcome Date GA Lbr Amanuel/2nd Weight Sex Type Anes PTL Lv   2 Current            1 Term  08/12/21 37w6d 16:00 / 00:55 3650 g (8 lb 0.8 oz) M Vag-Spont EPI N MATTEO      Name: CROW FLORES      Apgar1: 8  Apgar5: 9       Past Medical History: Past Medical History:   Diagnosis Date    Anemia     during pregnancy    Scoliosis     Urinary tract infection       Past Surgical History Past Surgical History:   Procedure Laterality Date    REVISION OF SCAR  2021    Revision of vaginal repair      Family History: History reviewed. No pertinent family history.   Social History:  reports that she has never smoked. She has never used smokeless tobacco.   reports that she does not currently use alcohol.   reports no history of drug use.                   General ROS Negative Findings:Headaches, Visual Changes, Epigastric pain, Anorexia, Nausia/Vomiting, ROM, and Vaginal Bleeding    ROS      Objective      Vital Signs Range for the last 24 hours  Temperature: Temp:  [97.5 °F (36.4 °C)-97.8 °F (36.6 °C)] 97.5 °F (36.4 °C)   Temp Source: Temp src: Oral   BP: BP: (107-133)/(52-85) 107/52   Pulse: Heart Rate:  [] 68   Respirations: Resp:  [16] 16   SPO2:     O2 Amount (l/min):     O2 Devices     Weight: Weight:  [88.5 kg (195 lb)] 88.5 kg (195 lb)     Physical Examination:   General:   alert, appears stated age, and cooperative   Skin:   normal   HEENT:  normal   Lungs:   Normal respiratory effort, no respiratory distress   Heart:   Regular rate   Abdomen:  Soft, gravid, nontender   Lower Extremities  no edema   Pelvis:  Exam deferred         Presentation: vtx   Cervix: Exam by: MD   Dilation:  5-6cm   Effacement: 70%   Station:  -2       Fetal Heart Rate Assessment   Method: Fetal HR Assessment Method: external (difficulty rtacing, pt on birthing ball)   Beats/min: Fetal HR (beats/min): 140   Baseline: Fetal HR Baseline: normal range   Varibility: Fetal HR Variability: moderate (amplitude range 6 to 25 bpm)   Accels: Fetal HR Accelerations: greater than/equal to 15 bpm, lasting at least 15 seconds   Decels: Fetal HR  Decelerations: absent   Tracing Category:  I     Uterine Assessment   Method: Method: external tocotransducer   Frequency (min): Contraction Frequency (Minutes): 2-3   Ctx Count in 10 min:     Duration:     Intensity: Contraction Intensity: mild by palpation   Intensity by IUPC:     Resting Tone: Uterine Resting Tone: soft by palpation   Resting Tone by IUPC:     [place] Units:       Laboratory Results:   Lab Results (last 24 hours)       Procedure Component Value Units Date/Time    Preeclampsia Panel [[id number]]  (Abnormal) Collected: 03/31/24 2124    Specimen: Blood Updated: 03/31/24 2156     Alkaline Phosphatase 123 U/L      ALT (SGPT) 7 U/L      AST (SGOT) 13 U/L      Creatinine 0.61 mg/dL      Total Bilirubin <0.2 mg/dL       U/L      Comment: Specimen hemolyzed.  Results may be affected.        Uric Acid 3.8 mg/dL     CBC (No Diff) [[id number]]  (Abnormal) Collected: 03/31/24 2124    Specimen: Blood Updated: 03/31/24 2137     WBC 9.98 10*3/mm3      RBC 3.86 10*6/mm3      Hemoglobin 10.3 g/dL      Hematocrit 31.9 %      MCV 82.6 fL      MCH 26.7 pg      MCHC 32.3 g/dL      RDW 15.0 %      RDW-SD 44.7 fl      MPV 12.3 fL      Platelets 210 10*3/mm3     T Pallidum Antibody w/ reflex RPR (Syphilis) [[id number]] Collected: 03/31/24 2124    Specimen: Blood Updated: 03/31/24 2134          Radiology Review:  Imaging Results (Last 24 Hours)       ** No results found for the last 24 hours. **          Other Studies:    Assessment & Plan      Term pregnancy        Assessment:  1.  Intrauterine pregnancy at 39w2d weeks gestation with reassuring fetal status.    2.  Elective induction of labor  3.  GBS negative  4.  Hx of vicryl rejection    Plan:  1. Admit to LD for IOL; s/p FB for cervical ripening followed by pitocin per protocol; AROM performed, clear fluid noted; analgesics and epidural PRN; repeat SVE q 2-4h or PRN; if perineal laceration required, plan to use chromic suture due to hx of vicryl rejection  2.  Plan of care has been reviewed with patient.  3.  Risks, benefits of treatment plan have been discussed.  4.  All questions have been answered.        Sammi Hauser MD  4/1/2024  08:13 EDT        Electronically signed by Sammi Hauser MD at 04/01/24 0819       Facility-Administered Medications as of 4/1/2024   Medication Dose Route Frequency Provider Last Rate Last Admin    acetaminophen (TYLENOL) tablet 650 mg  650 mg Oral Q6H PRN Svetlana Minor MD        benzocaine (AMERICAINE) 20 % rectal ointment 1 Application  1 Application Rectal PRN Svetlana Minor MD        benzocaine-menthol (DERMOPLAST) 20-0.5 % topical spray   Topical PRN Svetlana Minor MD        calcium carbonate (TUMS) chewable tablet 500 mg (200 mg elemental)  1 tablet Oral TID PRN Svetlana Minor MD        carboprost (HEMABATE) injection 250 mcg  250 mcg Intramuscular PRN Svetlana Minor MD        diphenhydrAMINE (BENADRYL) capsule 25 mg  25 mg Oral Nightly PRN Svetlana Minor MD        docusate sodium (COLACE) capsule 100 mg  100 mg Oral BID Svetlana Minor MD        HYDROcodone-acetaminophen (NORCO) 5-325 MG per tablet 1 tablet  1 tablet Oral Q4H PRN Svetlana Minor MD        Or    HYDROcodone-acetaminophen (NORCO)  MG per tablet 1 tablet  1 tablet Oral Q4H PRN Svetlana Minor MD        Hydrocortisone (Perianal) (ANUSOL-HC) 2.5 % rectal cream 1 Application  1 Application Rectal PRN Svetlana Minor MD        ibuprofen (ADVIL,MOTRIN) tablet 600 mg  600 mg Oral Q6H PRN Svetlana Minor MD        [COMPLETED] lactated ringers bolus 1,000 mL  1,000 mL Intravenous Once Sammi Hauser MD 4,000 mL/hr at 04/01/24 0839 1,000 mL at 04/01/24 0839    lanolin topical 1 Application  1 Application Topical Q1H PRN Svetlana Minor MD        magnesium hydroxide (MILK OF MAGNESIA) suspension 10 mL  10 mL Oral Daily PRN Svetlana Minor MD        Measles, Mumps & Rubella Vac (MMR) injection 0.5 mL  0.5 mL Subcutaneous During  Hospitalization Svetlana Minor MD        [COMPLETED] methylergonovine (METHERGINE) 0.2 MG/ML injection  - ADS Override Pull        200 mcg at 24 0917    methylergonovine (METHERGINE) injection 200 mcg  200 mcg Intramuscular Once PRN Svetlana Minor MD        miSOPROStol (CYTOTEC) tablet 800 mcg  800 mcg Oral PRN Svetlana Minor MD        ondansetron (ZOFRAN) injection 4 mg  4 mg Intravenous Q6H PRN Svetlana Minor MD        ondansetron ODT (ZOFRAN-ODT) disintegrating tablet 4 mg  4 mg Oral Q8H PRN Svetlana Minor MD        oxytocin (PITOCIN) 30 units in 0.9% sodium chloride 500 mL (premix)  125 mL/hr Intravenous Once PRN Svetlana Minor MD        oxytocin (PITOCIN) 30 units in 0.9% sodium chloride 500 mL (premix)  999 mL/hr Intravenous Once Sammi Hauser  mL/hr at 24 0910 999 mL/hr at 24 0910    Followed by    [] oxytocin (PITOCIN) 30 units in 0.9% sodium chloride 500 mL (premix)  250 mL/hr Intravenous Continuous Sammi Hauser MD        prenatal vitamin tablet 1 tablet  1 tablet Oral Daily Svetlana Minor MD        promethazine (PHENERGAN) tablet 12.5 mg  12.5 mg Oral Q4H PRN Svetlana Minor MD        simethicone (MYLICON) chewable tablet 80 mg  80 mg Oral 4x Daily PRN Svetlana Minor MD        sodium chloride 0.9 % flush 1-10 mL  1-10 mL Intravenous PRN Svetlana Minor MD        witch hazel-glycerin (TUCKS) pad   Topical PRN Svetlana Minor MD         Orders (last 48 hrs)        Start     Ordered    24 0800  Sitz Bath  3 Times Daily        Comments: PRN    24 1205    24 0600  CBC & Differential  Morning Draw        Comments: Postpartum Day 1      24 1205    24 1300  docusate sodium (COLACE) capsule 100 mg  2 Times Daily         24 1205    24 1300  prenatal vitamin tablet 1 tablet  Daily         24 1205    24 1206  Code Status and Medical Interventions:  Continuous         24 1205    24 1202   Vital Signs Per Hospital Policy  Per Hospital Policy         04/01/24 1205    04/01/24 1206  Notify Physician  Until Discontinued         04/01/24 1205    04/01/24 1206  Up Ad Latonia  Until Discontinued         04/01/24 1205    04/01/24 1206  Ambulate Patient  Every Shift       04/01/24 1205    04/01/24 1206  Diet: Regular/House; Fluid Consistency: Thin (IDDSI 0)  Diet Effective Now         04/01/24 1205    04/01/24 1206  Advance Diet As Tolerated -Regular  Until Discontinued         04/01/24 1205    04/01/24 1206  Fundal and Lochia Check  Per Order Details        Comments: Every 30 minutes x2, then Every Shift    04/01/24 1205    04/01/24 1206  RN to Assess Rh Status & Place RhIG Evaluation Order if Indicated  Continuous         04/01/24 1205    04/01/24 1206  Bladder Assessment  Per Order Details        Comments: Postpartum 1) Upon Admission to Unit & Every 4 Hours PRN Until Voiding. 2) Out of Bed to Void in 8 Hours.    04/01/24 1205    04/01/24 1206  Straight Cath  Per Order Details        Comments: Postpartum: If Distended & Unable to Void, May Repeat Once.    04/01/24 1205    04/01/24 1206  Indwelling Urinary Catheter  Per Order Details        Comments: Postpartum : After Straight Cathed x2 or if Greater Than 1000mL Residual, Insert Indwelling Urinary Catheter Until Further MD Order.    04/01/24 1205    04/01/24 1206  Apply Ice to Perineum  Per Order Details        Comments: For 20 Minutes Every 2 Hours    04/01/24 1205    04/01/24 1206  Kpad  Per Order Details        Comments: For Pain    04/01/24 1205    04/01/24 1206  Breast pump to bed  Once         04/01/24 1205    04/01/24 1206  If indicated -- Please administer RH Immunoglobulin based on results of cord blood evaluation and fetal screen lab tests, pharmacy to dispense  Per Order Details        Comments: See Process Instructions For Reference Range Details.    04/01/24 1205    04/01/24 1205  ibuprofen (ADVIL,MOTRIN) tablet 600 mg  Every 6 Hours PRN          "04/01/24 1205    04/01/24 1205  acetaminophen (TYLENOL) tablet 650 mg  Every 6 Hours PRN         04/01/24 1205    04/01/24 1205  HYDROcodone-acetaminophen (NORCO) 5-325 MG per tablet 1 tablet  Every 4 Hours PRN        Placed in \"Or\" Linked Group    04/01/24 1205    04/01/24 1205  HYDROcodone-acetaminophen (NORCO)  MG per tablet 1 tablet  Every 4 Hours PRN        Placed in \"Or\" Linked Group    04/01/24 1205    04/01/24 1205  carboprost (HEMABATE) injection 250 mcg  As Needed         04/01/24 1205    04/01/24 1205  miSOPROStol (CYTOTEC) tablet 800 mcg  As Needed         04/01/24 1205    04/01/24 1205  methylergonovine (METHERGINE) injection 200 mcg  Once As Needed         04/01/24 1205    04/01/24 1205  diphenhydrAMINE (BENADRYL) capsule 25 mg  Nightly PRN         04/01/24 1205    04/01/24 1205  magnesium hydroxide (MILK OF MAGNESIA) suspension 10 mL  Daily PRN         04/01/24 1205    04/01/24 1205  lanolin topical 1 Application  Every 1 Hour PRN         04/01/24 1205    04/01/24 1205  benzocaine-menthol (DERMOPLAST) 20-0.5 % topical spray  As Needed         04/01/24 1205    04/01/24 1205  witch hazel-glycerin (TUCKS) pad  As Needed         04/01/24 1205    04/01/24 1205  Hydrocortisone (Perianal) (ANUSOL-HC) 2.5 % rectal cream 1 Application  As Needed         04/01/24 1205    04/01/24 1205  benzocaine (AMERICAINE) 20 % rectal ointment 1 Application  As Needed         04/01/24 1205    04/01/24 1205  ondansetron ODT (ZOFRAN-ODT) disintegrating tablet 4 mg  Every 8 Hours PRN         04/01/24 1205    04/01/24 1205  ondansetron (ZOFRAN) injection 4 mg  Every 6 Hours PRN         04/01/24 1205    04/01/24 1205  promethazine (PHENERGAN) tablet 12.5 mg  Every 4 Hours PRN         04/01/24 1205    04/01/24 1205  simethicone (MYLICON) chewable tablet 80 mg  4 Times Daily PRN         04/01/24 1205    04/01/24 1205  calcium carbonate (TUMS) chewable tablet 500 mg (200 mg elemental)  3 Times Daily PRN         04/01/24 1205 " "   04/01/24 1205  Measles, Mumps & Rubella Vac (MMR) injection 0.5 mL  During Hospitalization         04/01/24 1205    04/01/24 1205  sodium chloride 0.9 % flush 1-10 mL  As Needed         04/01/24 1205    04/01/24 1205  oxytocin (PITOCIN) 30 units in 0.9% sodium chloride 500 mL (premix)  Once As Needed         04/01/24 1205    04/01/24 1130  oxytocin (PITOCIN) 30 units in 0.9% sodium chloride 500 mL (premix)  Continuous        Placed in \"Followed by\" Linked Group    04/01/24 1015    04/01/24 1115  oxytocin (PITOCIN) 30 units in 0.9% sodium chloride 500 mL (premix)  Once        Placed in \"Followed by\" Linked Group    04/01/24 1015    04/01/24 1016  Nurse may remove epidural catheter after delivery.  Until Discontinued         04/01/24 1015    04/01/24 1016  Transfer to postpartum when discharge criteria met.  Until Discontinued         04/01/24 1015    04/01/24 1016  Notify Physician (specified)  Until Discontinued         04/01/24 1015    04/01/24 1016  Vital Signs Per hospital policy  Per Hospital Policy         04/01/24 1015    04/01/24 1016  Fundal & Lochia Check  Per Order Details        Comments: Every 15 Minutes x4, Then Every 30 Minutes x2, Then Every Shift    04/01/24 1015    04/01/24 1016  Diet: Regular/House; Fluid Consistency: Thin (IDDSI 0)  Diet Effective Now,   Status:  Canceled         04/01/24 1015    04/01/24 1015  Fundal & Lochia Check  Every Shift       04/01/24 1015    04/01/24 1015  acetaminophen (TYLENOL) tablet 650 mg  Every 4 Hours PRN,   Status:  Discontinued         04/01/24 1015    04/01/24 1015  ibuprofen (ADVIL,MOTRIN) tablet 600 mg  Every 6 Hours PRN,   Status:  Discontinued         04/01/24 1015    04/01/24 1015  oxyCODONE-acetaminophen (PERCOCET) 5-325 MG per tablet 1 tablet  Every 4 Hours PRN,   Status:  Discontinued         04/01/24 1015    04/01/24 1015  oxyCODONE-acetaminophen (PERCOCET)  MG per tablet 2 tablet  Every 4 Hours PRN,   Status:  Discontinued         04/01/24 " "1015 04/01/24 1015  methylergonovine (METHERGINE) injection 200 mcg  Once As Needed,   Status:  Discontinued         04/01/24 1015 04/01/24 1015  carboprost (HEMABATE) injection 250 mcg  As Needed,   Status:  Discontinued         04/01/24 1015    04/01/24 1015  miSOPROStol (CYTOTEC) tablet 800 mcg  As Needed,   Status:  Discontinued         04/01/24 1015 04/01/24 1015  ondansetron ODT (ZOFRAN-ODT) disintegrating tablet 4 mg  Every 6 Hours PRN,   Status:  Discontinued        Placed in \"Or\" Linked Group    04/01/24 1015    04/01/24 1015  ondansetron (ZOFRAN) injection 4 mg  Every 6 Hours PRN,   Status:  Discontinued        Placed in \"Or\" Linked Group    04/01/24 1015 04/01/24 1000  ropivacaine (NAROPIN) 0.2 % injection  Continuous,   Status:  Discontinued         04/01/24 0903 04/01/24 1000  Sod Citrate-Citric Acid (BICITRA) oral solution 30 mL  Once,   Status:  Discontinued         04/01/24 0903 04/01/24 1000  ! Epidural  2 Times Daily,   Status:  Discontinued         04/01/24 0905 04/01/24 0925  VTE Prophylaxis Not Indicated: Obesity-BMI >30 (1); </= 3 (Low Risk)  Once         04/01/24 0924 04/01/24 0916  methylergonovine (METHERGINE) 0.2 MG/ML injection  - ADS Override Pull        Note to Pharmacy: Created by cabinet override    04/01/24 0916    04/01/24 0904  Vital Signs Per Anesthesia Guidelines  Per Order Details,   Status:  Canceled        Comments: Every 3 Minutes x20 Minutes Following Epidural Dosing, Then Every 15 Minutes If Stable    04/01/24 0903 04/01/24 0904  Start IV with #16 or #18 gauge angiocath.  Once,   Status:  Canceled         04/01/24 0903 04/01/24 0904  Nurse or anesthesiologist to remain with patient for 15 minutes following dosing.  Until Discontinued,   Status:  Canceled         04/01/24 0903 04/01/24 0904  Facilitate maternal postion on side and maintain uterine displacement.  Until Discontinued,   Status:  Canceled         04/01/24 0903    04/01/24 0904  " Consult anesthesia services prior to changing epidural infusion/rate.  Until Discontinued,   Status:  Canceled         04/01/24 0903    04/01/24 0903  lactated ringers bolus 1,000 mL  As Needed,   Status:  Discontinued         04/01/24 0903 04/01/24 0903  ePHEDrine Sulfate (Pressors) 5 MG/ML injection 10 mg  Every 10 Minutes PRN,   Status:  Discontinued         04/01/24 0903 04/01/24 0903  metoclopramide (REGLAN) injection 10 mg  Once As Needed,   Status:  Discontinued         04/01/24 0903    04/01/24 0903  ondansetron (ZOFRAN) injection 4 mg  Once As Needed,   Status:  Discontinued         04/01/24 0903 04/01/24 0903  famotidine (PEPCID) injection 20 mg  Once As Needed,   Status:  Discontinued         04/01/24 0903 04/01/24 0903  diphenhydrAMINE (BENADRYL) injection 12.5 mg  Every 8 Hours PRN,   Status:  Discontinued         04/01/24 0903 04/01/24 0600  Colmenares Bulb Cervical Ripening w/ infusion  Once,   Status:  Canceled        Comments: Have laborist place cervical Colmenares w/ infusion.  If unable to place Colmenares, start low dose Pitocin drip overnight then increase per protocol @ 5 am.    03/31/24 2055 04/01/24 0000  Vital Signs q 4 while awake  Every 4 Hours,   Status:  Canceled      Comments: While the patient is awake.    03/31/24 2055 03/31/24 2200  mineral oil liquid 30 mL  Once,   Status:  Discontinued         03/31/24 2055 03/31/24 2145  sodium chloride 0.9 % flush 10 mL  Every 12 Hours Scheduled,   Status:  Discontinued         03/31/24 2055 03/31/24 2130  lactated ringers bolus 1,000 mL  Once         03/31/24 2055 03/31/24 2130  lactated ringers infusion  Continuous,   Status:  Discontinued         03/31/24 2055 03/31/24 2130  oxytocin (PITOCIN) 30 units in 0.9% sodium chloride 500 mL (premix)  Titrated,   Status:  Discontinued         03/31/24 2055 03/31/24 2059  Preeclampsia Panel  STAT         03/31/24 2058 03/31/24 2057  CBC (No Diff)  STAT         03/31/24 2057     24  T Pallidum Antibody w/ reflex RPR (Syphilis)  STAT         24  Type & Screen  STAT         24  Admit To Obstetrics Inpatient  Once         24  Code Status and Medical Interventions:  Continuous,   Status:  Canceled         24  Obtain informed consent  Once,   Status:  Canceled         24  Vital Signs Per hospital policy  Per Hospital Policy,   Status:  Canceled         24  Continuous Fetal Monitoring With NST on Admission and Prior to Initiation of Oxytocin.  Per Order Details,   Status:  Canceled        Comments: Continuous Fetal Monitoring With NST on Admission & Prior to Initiation of Oxytocin.    24  External Uterine Contraction Monitoring  Per Hospital Policy,   Status:  Canceled         24  Notify Physician (specified)  Until Discontinued,   Status:  Canceled         24  Notify physician for tachysystole (per hospital algorithm)  Until Discontinued,   Status:  Canceled         24  Notify physician if membranes ruptured, bleeding greater than 1 pad an hour, fetal heart tone abnormality, and severe pain  Until Discontinued,   Status:  Canceled         24  Up Ad [place]  Until Discontinued,   Status:  Canceled         24  Cervical Exam  Once,   Status:  Canceled        Comments: Unless contraindicated, every 1-2 hours in active labor, or at nurse's discretion.    24  Initiate Group Beta Strep (GBS) Prophylaxis Protocol, If Criteria Met  Continuous,   Status:  Canceled        Comments: NO TREATMENT RECOMMENDED IF: 1)  Maternal GBS status known negative 2)  Scheduled  birth with intact membranes, not in labor.  3 ) Maternal GBS unknown, no  risk factors.   TREAT WITH ANTIBIOTICS IF:  1)  Maternal GBS status is known postive.  2)  Maternal GBS status unknown with these risk factors:  a)  Previous infant affected by GBS infection.  b)  GBS urinary tract infection (UTI) or bacteruria during pregnancy  c)  Unexplained maternal fever in labor (greater than or equal to 100.4F or 38.0C)  d)  Prolonged rupture of the membranes greater than or equal to 18 hours.  e)  Gestational age less than 37 weeks.    03/31/24 2055 03/31/24 2056  Bedside ultrasound for fetal presentation  Once,   Status:  Canceled         03/31/24 2055 03/31/24 2056  NPO Diet NPO Type: Ice Chips  Diet Effective Now,   Status:  Canceled         03/31/24 2055 03/31/24 2056  CBC (No Diff)  Once,   Status:  Canceled         03/31/24 2055 03/31/24 2056  T Pallidum Antibody w/ reflex RPR (Syphilis)  Once,   Status:  Canceled         03/31/24 2055 03/31/24 2056  Type & Screen  Once,   Status:  Canceled         03/31/24 2055 03/31/24 2056  Insert Peripheral IV  Once,   Status:  Canceled         03/31/24 2055 03/31/24 2056  Saline Lock & Maintain IV Access  Continuous,   Status:  Canceled         03/31/24 2055 03/31/24 2056  Maintain Sequential Compression Device  Continuous,   Status:  Canceled         03/31/24 2055 03/31/24 2056  Place Sequential Compression Device  Once,   Status:  Canceled         03/31/24 2055 03/31/24 2056  Inpatient Anesthesiology Consult  Once        Specialty:  Anesthesiology  Provider:  (Not yet assigned)    03/31/24 2055 03/31/24 2055  Position change  As Needed,   Status:  Canceled      Comments: For intra-uterine resuscitation for hypertonus, hypertstimulation, or non-reassuring fetal status    03/31/24 2055 03/31/24 2055  sodium chloride 0.9 % flush 10 mL  As Needed,   Status:  Discontinued         03/31/24 2055 03/31/24 2055  sodium chloride 0.9 % infusion 40 mL  As Needed,   Status:  Discontinued         03/31/24 2055     "24  lidocaine PF 1% (XYLOCAINE) injection 0.5 mL  Once As Needed,   Status:  Discontinued         24  acetaminophen (TYLENOL) tablet 650 mg  Every 4 Hours PRN,   Status:  Discontinued         24  ondansetron ODT (ZOFRAN-ODT) disintegrating tablet 4 mg  Every 6 Hours PRN,   Status:  Discontinued        Placed in \"Or\" Linked Group    24  ondansetron (ZOFRAN) injection 4 mg  Every 6 Hours PRN,   Status:  Discontinued        Placed in \"Or\" Linked Group    24  terbutaline (BRETHINE) injection 0.25 mg  As Needed,   Status:  Discontinued         24  morphine injection 1 mg  Every 4 Hours PRN,   Status:  Discontinued        Placed in \"And\" Linked Group    24  naloxone (NARCAN) injection 0.4 mg  Every 5 Minutes PRN,   Status:  Discontinued        Placed in \"And\" Linked Group    24  morphine injection 2 mg  Every 4 Hours PRN,   Status:  Discontinued        Placed in \"And\" Linked Group    24  naloxone (NARCAN) injection 0.4 mg  Every 5 Minutes PRN,   Status:  Discontinued        Placed in \"And\" Linked Group    24    Unscheduled  Warm compress  As Needed       24 1205    Unscheduled  Apply ace wrap, tight bra, or binder  As Needed       24 1205    Unscheduled  Apply ice packs  As Needed       24 1205    Unscheduled  Up with Assistance  As Needed       24 1015    Unscheduled  Apply Ice to Perineum  As Needed       24 1015    Unscheduled  Bladder Assessment  As Needed       24 1015                     Operative/Procedure Notes (last 48 hours)        Dutch Ghotra MD at 24 2211          28 y.o.  OB History          2    Para   1    Term   1            AB        Living   1         SAB        IAB        Ectopic        Molar        " Multiple   0    Live Births   1             Presents at 39 1/7 weeks as an induction of labour due to unfavourable cervix   Her primary OB requests a Colmenares Bulb placement to initiate the induction of labour.    Fetal Heart Rate Assessment   Method:     Beats/min:     Baseline:     Varibility:     Accels:     Decels:     Tracing Category:       TOCO:  None   SVE: /-2    A Colmenares Bulb was placed without difficulties with 60 cc of sterile saline.  The patient tolerated the procedure well.    Electronically signed by Duthc Ghotra MD at 24 2212       Svetlana Minor MD at 24 0924           [facility]   Vaginal Delivery Note    Patient Name: Marely Mcgee  : 1995  MRN: [id number]    Date of Delivery: 2024     Diagnosis     Pre & Post-Delivery:  Intrauterine pregnancy at 39w2d  Labor status: Induced Onset of Labor     Term pregnancy             Problem List    Transfer to Postpartum     Review the Delivery Report for details.     Delivery     Delivery: Vaginal, Spontaneous     YOB: 2023    Time of Birth:  Gestational Age 9:07 AM   39w2d     Anesthesia:   Epidural   Delivering clinician: Svetlana Minor    Forceps?   No   Vacuum? No    Shoulder dystocia present: No        Delivery narrative:  The patient progressed to complete and delivered a VMI  with Apgars 8/9 the weight is  8#1 via  with epidural anesthesia. Shoulders were delivered easily. The cord was clamped and cut after 60 second delay and the infant was placed on the mother's chest for skin- to - skin. Cord blood was obtained and the placenta was delivered spontaneously intact. 30 units of IV Pitocin was started. Uterine tone was appropriate.   Second degree laceration(s) noted  and repaired with 2-0 chromic.   The patient tolerated the procedure well and remained in the LDR for recovery. All counts correct.        Infant     Findings: male  infant     Infant observations: Weight: 3655 g (8 lb 0.9 oz)  "  Length: 20.5  in  Observations/Comments:        Apgars: 8  @ 1 minute /    9  @ 5 minutes   Infant Name:      Placenta & Cord         Placenta delivered  Spontaneous  at   4/1/2024  9:10 AM     Cord: 3 vessels  present.   Nuchal Cord?  no   Cord blood obtained: Yes    Cord gases obtained:  No    Cord gas results: Venous:  No results found for: \"PHCVEN\", \"BECVEN\"    Arterial:  No results found for: \"PHCART\", \"BECART\"     Repair     Episiotomy: None     No    Lacerations: Yes  Laceration Information  Laceration Repaired?   Perineal: 2nd  Yes    Periurethral:       Labial:       Sulcus:       Vaginal:       Cervical:         Suture used for repair: 2-0 chromic gut  Laceration Length for 3rd or 4th degree lacerations: n/a cm   Estimated Blood Loss:       Quantitative Blood Loss:     250mL     Complications     none    Disposition     Mother to Mother Baby/Postpartum  in stable condition currently.  Baby to remains with mom  in stable condition currently.    Svetlana Minor MD  04/01/24  09:24 EDT          Electronically signed by Svetlana Minor MD at 04/01/24 0925       Physician Progress Notes (last 48 hours)  Notes from 03/30/24 1245 through 04/01/24 1245   No notes of this type exist for this encounter.       "

## 2024-04-01 NOTE — ANESTHESIA PREPROCEDURE EVALUATION
Anesthesia Evaluation     Patient summary reviewed and Nursing notes reviewed                Airway   Mallampati: II  TM distance: >3 FB  Neck ROM: full  No difficulty expected  Dental      Pulmonary - negative pulmonary ROS   Cardiovascular - negative cardio ROS        Neuro/Psych- negative ROS  GI/Hepatic/Renal/Endo - negative ROS     Musculoskeletal (-) negative ROS    Abdominal    Substance History - negative use     OB/GYN negative ob/gyn ROS   (+) Pregnant        Other                    Anesthesia Plan    ASA 2     epidural       Anesthetic plan, risks, benefits, and alternatives have been provided, discussed and informed consent has been obtained with: patient.    Use of blood products discussed with patient .      CODE STATUS:    Level Of Support Discussed With: Patient  Code Status (Patient has no pulse and is not breathing): CPR (Attempt to Resuscitate)  Medical Interventions (Patient has pulse or is breathing): Full Support

## 2024-04-01 NOTE — LACTATION NOTE
24 1350   Maternal Information   Date of Referral 24   Person Making Referral patient  (latch check)   Maternal Reason for Referral breastfeeding unsuccessful in past   Infant Reason for Referral  infant   Maternal Assessment   Breast Size Issue none   Breast Shape Bilateral:;pendulous   Breast Density Bilateral:;soft   Nipples Bilateral:;everted   Left Nipple Symptoms intact;nontender   Right Nipple Symptoms intact;nontender   Maternal Infant Feeding   Maternal Emotional State receptive;relaxed   Infant Positioning clutch/football;cradle  (moved from right cradle to right cross cradle)   Signs of Milk Transfer deep jaw excursions noted;audible swallow  (very good latch)   Pain with Feeding no   Latch Assistance minimal assistance   Support Person Involvement verbally supports mother     Patient called out for a latch check.  Infant was attempting to latch in cradle position but was difficult for mother to get infant latch.  Readjusted to a football hold and assisted in adjusting mother and infant positioning.  Infant was able to get on in a right football hold and had a deep latch.  Mother stated she felt tugging but no pain/discomfort.  Encouraged putting to the breast every 3 hours or more often with feeding cues.  All questions/concerns answered at this time.

## 2024-04-01 NOTE — H&P
"[facility]  Obstetric History and Physical    Chief Complaint   Patient presents with    Scheduled Induction       Subjective     Patient is a 28 y.o. female  currently at 39w2d, who presented overnight for scheduled elective induction of labor. She is s/p FB overnight for cervical ripening.    Her prenatal care has been uncomplicated.  Her previous obstetric/gynecological history is noted and is remarkable for prior FTSVD x 1. She had a complicated perineal laceration during her last delivery which was repaired with vicryl. 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    The following portions of the patients history were reviewed and updated as appropriate: current medications, allergies, past medical history, past surgical history, past family history, past social history, and problem list .       Prenatal Information:   Maternal Prenatal Labs  Blood Type ABO Type   Date Value Ref Range Status   2024 A  Final      Rh Status RH type   Date Value Ref Range Status   2024 Positive  Final      Antibody Screen Antibody Screen   Date Value Ref Range Status   2024 Negative  Final      Gonnorhea No results found for: \"GCCX\"   Chlamydia No results found for: \"CLAMYDCU\"   RPR No results found for: \"RPR\"   Syphilis Antibody No results found for: \"SYPHILIS\"   Rubella No results found for: \"RUBELLAIGGIN\"   Hepatitis B Surface Antigen No results found for: \"HEPBSAG\"   HIV-1 Antibody No results found for: \"LABHIV1\"   Hepatitis C Antibody No results found for: \"HEPCAB\"   Rapid Urin Drug Screen No results found for: \"AMPMETHU\", \"BARBITSCNUR\", \"LABBENZSCN\", \"LABMETHSCN\", \"LABOPIASCN\", \"THCURSCR\", \"COCAINEUR\", \"AMPHETSCREEN\", \"PROPOXSCN\", \"BUPRENORSCNU\", \"METAMPSCNUR\", \"OXYCODONESCN\", \"TRICYCLICSCN\"   Group B Strep Culture No results found for: \"GBSANTIGEN\"           External Prenatal Results       Pregnancy Outside Results - Transcribed From Office Records - See Scanned Records For " Details       Test Value Date Time    ABO  A  24    Rh  Positive  24    Antibody Screen  Negative  24       Negative  01/10/24 1022       Negative  23 1057    Varicella IgG       Rubella  5.41 index 23 1057    Hgb  10.3 g/dL 24       10.9 g/dL 01/10/24 1022       11.2 g/dL 23 1057    Hct  31.9 % 24       32.9 % 01/10/24 1022       34.4 % 23 1057    Glucose Fasting GTT       Glucose Tolerance Test 1 hour ^ 108  21     Glucose Tolerance Test 3 hour       Gonorrhea (discrete)       Chlamydia (discrete)       RPR  Non-Reactive  23 1057    VDRL       Syphilis Antibody       HBsAg  Non-Reactive  23 1057    Herpes Simplex Virus PCR       Herpes Simplex VIrus Culture       HIV  Non-Reactive  23 1057    Hep C RNA Quant PCR       Hep C Antibody  Non-Reactive  21 1324    AFP       Group B Strep ^ NEG  21     GBS Susceptibility to Clindamycin       GBS Susceptibility to Erythromycin       Fetal Fibronectin       Genetic Testing, Maternal Blood                 Drug Screening       Test Value Date Time    Urine Drug Screen       Amphetamine Screen  Negative  23 1057    Barbiturate Screen  Negative  23 1057    Benzodiazepine Screen  Negative  23 1057    Methadone Screen  Negative  23 1057    Phencyclidine Screen  Negative  23 1057    Opiates Screen  Negative  23 1057    THC Screen  Negative  23 1057    Cocaine Screen       Propoxyphene Screen  Negative  21 1221    Buprenorphine Screen  Negative  23 1057    Methamphetamine Screen       Oxycodone Screen  Negative  23 1057    Tricyclic Antidepressants Screen  Negative  23 1057              Legend    ^: Historical                              Past OB History:       OB History    Para Term  AB Living   2 1 1 0 0 1   SAB IAB Ectopic Molar Multiple Live Births   0 0 0 0 0 1      # Outcome Date GA  Lbr Amanuel/2nd Weight Sex Type Anes PTL Lv   2 Current            1 Term 08/12/21 37w6d 16:00 / 00:55 3650 g (8 lb 0.8 oz) M Vag-Spont EPI N MATTEO      Name: CROW FLORES      Apgar1: 8  Apgar5: 9       Past Medical History: Past Medical History:   Diagnosis Date    Anemia     during pregnancy    Scoliosis     Urinary tract infection       Past Surgical History Past Surgical History:   Procedure Laterality Date    REVISION OF SCAR  2021    Revision of vaginal repair      Family History: History reviewed. No pertinent family history.   Social History:  reports that she has never smoked. She has never used smokeless tobacco.   reports that she does not currently use alcohol.   reports no history of drug use.                   General ROS Negative Findings:Headaches, Visual Changes, Epigastric pain, Anorexia, Nausia/Vomiting, ROM, and Vaginal Bleeding    ROS      Objective       Vital Signs Range for the last 24 hours  Temperature: Temp:  [97.5 °F (36.4 °C)-97.8 °F (36.6 °C)] 97.5 °F (36.4 °C)   Temp Source: Temp src: Oral   BP: BP: (107-133)/(52-85) 107/52   Pulse: Heart Rate:  [] 68   Respirations: Resp:  [16] 16   SPO2:     O2 Amount (l/min):     O2 Devices     Weight: Weight:  [88.5 kg (195 lb)] 88.5 kg (195 lb)     Physical Examination:   General:   alert, appears stated age, and cooperative   Skin:   normal   HEENT:  normal   Lungs:   Normal respiratory effort, no respiratory distress   Heart:   Regular rate   Abdomen:  Soft, gravid, nontender   Lower Extremities  no edema   Pelvis:  Exam deferred         Presentation: vtx   Cervix: Exam by: MD   Dilation:  5-6cm   Effacement: 70%   Station:  -2       Fetal Heart Rate Assessment   Method: Fetal HR Assessment Method: external (difficulty rtacing, pt on birthing ball)   Beats/min: Fetal HR (beats/min): 140   Baseline: Fetal HR Baseline: normal range   Varibility: Fetal HR Variability: moderate (amplitude range 6 to 25 bpm)   Accels: Fetal HR Accelerations:  greater than/equal to 15 bpm, lasting at least 15 seconds   Decels: Fetal HR Decelerations: absent   Tracing Category:  I     Uterine Assessment   Method: Method: external tocotransducer   Frequency (min): Contraction Frequency (Minutes): 2-3   Ctx Count in 10 min:     Duration:     Intensity: Contraction Intensity: mild by palpation   Intensity by IUPC:     Resting Tone: Uterine Resting Tone: soft by palpation   Resting Tone by IUPC:     Mallard Units:       Laboratory Results:   Lab Results (last 24 hours)       Procedure Component Value Units Date/Time    Preeclampsia Panel [[id number]]  (Abnormal) Collected: 03/31/24 2124    Specimen: Blood Updated: 03/31/24 2156     Alkaline Phosphatase 123 U/L      ALT (SGPT) 7 U/L      AST (SGOT) 13 U/L      Creatinine 0.61 mg/dL      Total Bilirubin <0.2 mg/dL       U/L      Comment: Specimen hemolyzed.  Results may be affected.        Uric Acid 3.8 mg/dL     CBC (No Diff) [[id number]]  (Abnormal) Collected: 03/31/24 2124    Specimen: Blood Updated: 03/31/24 2137     WBC 9.98 10*3/mm3      RBC 3.86 10*6/mm3      Hemoglobin 10.3 g/dL      Hematocrit 31.9 %      MCV 82.6 fL      MCH 26.7 pg      MCHC 32.3 g/dL      RDW 15.0 %      RDW-SD 44.7 fl      MPV 12.3 fL      Platelets 210 10*3/mm3     T Pallidum Antibody w/ reflex RPR (Syphilis) [[id number]] Collected: 03/31/24 2124    Specimen: Blood Updated: 03/31/24 2134          Radiology Review:  Imaging Results (Last 24 Hours)       ** No results found for the last 24 hours. **          Other Studies:    Assessment & Plan       Term pregnancy        Assessment:  1.  Intrauterine pregnancy at 39w2d weeks gestation with reassuring fetal status.    2.  Elective induction of labor  3.  GBS negative  4.  Hx of vicryl rejection    Plan:  1. Admit to LD for IOL; s/p FB for cervical ripening followed by pitocin per protocol; AROM performed, clear fluid noted; analgesics and epidural PRN; repeat SVE q 2-4h or PRN; if perineal  laceration required, plan to use chromic suture due to hx of vicryl rejection  2. Plan of care has been reviewed with patient.  3.  Risks, benefits of treatment plan have been discussed.  4.  All questions have been answered.        Sammi Hauser MD  4/1/2024  08:13 EDT

## 2024-04-01 NOTE — PROCEDURES
28 y.o.  OB History          2    Para   1    Term   1            AB        Living   1         SAB        IAB        Ectopic        Molar        Multiple   0    Live Births   1             Presents at 39 1/7 weeks as an induction of labour due to unfavourable cervix   Her primary OB requests a Colmenares Bulb placement to initiate the induction of labour.    Fetal Heart Rate Assessment   Method:     Beats/min:     Baseline:     Varibility:     Accels:     Decels:     Tracing Category:       TOCO:  None   SVE: /-2    A Colmenares Bulb was placed without difficulties with 60 cc of sterile saline.  The patient tolerated the procedure well.   No

## 2024-04-01 NOTE — L&D DELIVERY NOTE
" [facility]   Vaginal Delivery Note    Patient Name: Marely Mcgee  : 1995  MRN: [id number]    Date of Delivery: 2024     Diagnosis     Pre & Post-Delivery:  Intrauterine pregnancy at 39w2d  Labor status: Induced Onset of Labor     Term pregnancy             Problem List    Transfer to Postpartum     Review the Delivery Report for details.     Delivery     Delivery: Vaginal, Spontaneous     YOB: 2023    Time of Birth:  Gestational Age 9:07 AM   39w2d     Anesthesia:   Epidural   Delivering clinician: Svetlana Minor    Forceps?   No   Vacuum? No    Shoulder dystocia present: No        Delivery narrative:  The patient progressed to complete and delivered a VMI  with Apgars 8/9 the weight is  8#1 via  with epidural anesthesia. Shoulders were delivered easily. The cord was clamped and cut after 60 second delay and the infant was placed on the mother's chest for skin- to - skin. Cord blood was obtained and the placenta was delivered spontaneously intact. 30 units of IV Pitocin was started. Uterine tone was appropriate.   Second degree laceration(s) noted  and repaired with 2-0 chromic.   The patient tolerated the procedure well and remained in the LDR for recovery. All counts correct.        Infant     Findings: male  infant     Infant observations: Weight: 3655 g (8 lb 0.9 oz)   Length: 20.5  in  Observations/Comments:        Apgars: 8  @ 1 minute /    9  @ 5 minutes   Infant Name:      Placenta & Cord         Placenta delivered  Spontaneous  at   2024  9:10 AM     Cord: 3 vessels  present.   Nuchal Cord?  no   Cord blood obtained: Yes    Cord gases obtained:  No    Cord gas results: Venous:  No results found for: \"PHCVEN\", \"BECVEN\"    Arterial:  No results found for: \"PHCART\", \"BECART\"     Repair     Episiotomy: None     No    Lacerations: Yes  Laceration Information  Laceration Repaired?   Perineal: 2nd  Yes    Periurethral:       Labial:       Sulcus:       Vaginal:     "   Cervical:         Suture used for repair: 2-0 chromic gut  Laceration Length for 3rd or 4th degree lacerations: n/a cm   Estimated Blood Loss:       Quantitative Blood Loss:     250mL     Complications     none    Disposition     Mother to Mother Baby/Postpartum  in stable condition currently.  Baby to remains with mom  in stable condition currently.    Svetlana Minor MD  04/01/24  09:24 EDT

## 2024-04-01 NOTE — LACTATION NOTE
24 1145   Maternal Information   Date of Referral 24   Person Making Referral lactation consultant  (newly postpartum)   Maternal Reason for Referral breastfeeding unsuccessful in past  (#1 child - breastfeed for 4 days and infant was losing weight, breastfeeding was painful so decided to switch to formula.)   Infant Reason for Referral  infant   Maternal Infant Feeding   Maternal Emotional State receptive;relaxed   Support Person Involvement verbally supports mother   Milk Expression/Equipment   Breast Pump Type double electric, personal  (Old Spectra 1 from first pregnancy)   Breast Pump Flange Type hard   Breast Pumping   Breast Pumping Interventions other (see comments)  (can pump for short or missed feeds)   Lactation Referrals   Lactation Referrals outpatient lactation program  (discussed the outpatient lactation clinic if any issues with breastfeeding.)     Courtesy visit with breastfeeding mother.  Encouraged PRN lactation as needed and discussed the outpatient lactation clinic for any needs after discharge.  Went over basic breastfeeding information and provided written materials with a QR code to  and breastpump QR codes.  Encouraged mother to look at the hospital booklet starting on page 35 for breastfeeding information. Encouraged attempting to breastfeed every 3 hours or more often with feeding cues, using stimulation to encourage high quality milk transfer.

## 2024-04-01 NOTE — ANESTHESIA PROCEDURE NOTES
Labor Epidural      Patient reassessed immediately prior to procedure    Patient location during procedure: OB  Performed By  Anesthesiologist: Mason Camacho DO  Preanesthetic Checklist  Completed: patient identified, IV checked, site marked, risks and benefits discussed, surgical consent, monitors and equipment checked, pre-op evaluation and timeout performed  Additional Notes  Dural puncture performed with a 5 inch 25 g Henri needle, clear CSF.  Prep:  Pt Position:sitting  Sterile Tech:gloves, mask, sterile barrier and cap  Prep:chlorhexidine gluconate and isopropyl alcohol  Monitoring:blood pressure monitoring and continuous pulse oximetry  Epidural Block Procedure:  Approach:midline  Guidance:landmark technique and palpation technique  Location:L3-L4  Needle Type:Tuohy  Needle Gauge:17 G  Loss of Resistance Medium: air  Loss of Resistance: 5cm  Cath Depth at skin:11 cm  Paresthesia: none  Aspiration:negative  Test Dose:negative  Number of Attempts: 1  Post Assessment:  Dressing:secured with tape and occlusive dressing applied (Tegaderm Placed)  Pt Tolerance:patient tolerated the procedure well with no apparent complications  Complications:no

## 2024-04-02 LAB
BASOPHILS # BLD AUTO: 0.04 10*3/MM3 (ref 0–0.2)
BASOPHILS NFR BLD AUTO: 0.4 % (ref 0–1.5)
DEPRECATED RDW RBC AUTO: 44.9 FL (ref 37–54)
EOSINOPHIL # BLD AUTO: 0.14 10*3/MM3 (ref 0–0.4)
EOSINOPHIL NFR BLD AUTO: 1.2 % (ref 0.3–6.2)
ERYTHROCYTE [DISTWIDTH] IN BLOOD BY AUTOMATED COUNT: 15.5 % (ref 12.3–15.4)
HCT VFR BLD AUTO: 28.9 % (ref 34–46.6)
HGB BLD-MCNC: 9.4 G/DL (ref 12–15.9)
IMM GRANULOCYTES # BLD AUTO: 0.07 10*3/MM3 (ref 0–0.05)
IMM GRANULOCYTES NFR BLD AUTO: 0.6 % (ref 0–0.5)
LYMPHOCYTES # BLD AUTO: 2.38 10*3/MM3 (ref 0.7–3.1)
LYMPHOCYTES NFR BLD AUTO: 21.2 % (ref 19.6–45.3)
MCH RBC QN AUTO: 26.7 PG (ref 26.6–33)
MCHC RBC AUTO-ENTMCNC: 32.5 G/DL (ref 31.5–35.7)
MCV RBC AUTO: 82.1 FL (ref 79–97)
MONOCYTES # BLD AUTO: 0.78 10*3/MM3 (ref 0.1–0.9)
MONOCYTES NFR BLD AUTO: 6.9 % (ref 5–12)
NEUTROPHILS NFR BLD AUTO: 69.7 % (ref 42.7–76)
NEUTROPHILS NFR BLD AUTO: 7.83 10*3/MM3 (ref 1.7–7)
NRBC BLD AUTO-RTO: 0 /100 WBC (ref 0–0.2)
PLATELET # BLD AUTO: 188 10*3/MM3 (ref 140–450)
PMV BLD AUTO: 11.7 FL (ref 6–12)
RBC # BLD AUTO: 3.52 10*6/MM3 (ref 3.77–5.28)
WBC NRBC COR # BLD AUTO: 11.24 10*3/MM3 (ref 3.4–10.8)

## 2024-04-02 PROCEDURE — 85025 COMPLETE CBC W/AUTO DIFF WBC: CPT | Performed by: OBSTETRICS & GYNECOLOGY

## 2024-04-02 RX ORDER — FERROUS SULFATE 325(65) MG
325 TABLET ORAL
Status: DISCONTINUED | OUTPATIENT
Start: 2024-04-02 | End: 2024-04-03 | Stop reason: HOSPADM

## 2024-04-02 RX ADMIN — DOCUSATE SODIUM 100 MG: 100 CAPSULE, LIQUID FILLED ORAL at 08:05

## 2024-04-02 RX ADMIN — IBUPROFEN 600 MG: 600 TABLET ORAL at 08:06

## 2024-04-02 RX ADMIN — DOCUSATE SODIUM 100 MG: 100 CAPSULE, LIQUID FILLED ORAL at 21:28

## 2024-04-02 RX ADMIN — PRENATAL VITAMINS-IRON FUMARATE 27 MG IRON-FOLIC ACID 0.8 MG TABLET 1 TABLET: at 08:05

## 2024-04-02 RX ADMIN — FERROUS SULFATE TAB 325 MG (65 MG ELEMENTAL FE) 325 MG: 325 (65 FE) TAB at 10:16

## 2024-04-02 NOTE — LACTATION NOTE
04/02/24 0830   Maternal Information   Date of Referral 04/02/24   Person Making Referral family  (Follow-up consult)   Maternal Reason for Referral   (First baby never latched.  Attempted to breast-feed for 4 days and then quit.  States milk came in after that but did not attempt to pump.)   Infant Reason for Referral   (Baby not in room.  States baby has been a little uncomfortable with breast-feeding.  Has been using cradle hold.)   Maternal Assessment   Breast Size Issue none   Breast Shape Bilateral:;round   Breast Density Bilateral:;soft   Nipples Bilateral:;graspable   Left Nipple Symptoms intact;redness;tender   Right Nipple Symptoms intact;redness;tender   Milk Expression/Equipment   Breast Pump Type double electric, personal  (Mom has a Spectra S1 pump and has not watched the instructional video.  Gave handout with QR code, and written instructions for pump use.  Encouraged mom to watch this video and then call if she has questions)   Breast Pump Flange Type hard   Breast Pump Flange Size 24 mm  (Discussed when to move from the 24 mm flanged to the larger flange.)   Breast Pumping   Breast Pumping Interventions   (Discussed pumping after feedings if pain continues.)     Encouraged to try cross cradle or football hold to provide more support to baby during feeding.  Also to support breast during feeding to help baby stay latched deeper.  Encouraged as much skin to skin as possible.  Can call for help with the feeding, if problems continue.

## 2024-04-02 NOTE — PROGRESS NOTES
4/2/2024  PPD #1    Subjective   Marely feels well.  Patient describes her lochia as less than menses.  Pain is well controlled       Objective   Temp: Temp:  [97.6 °F (36.4 °C)-98.2 °F (36.8 °C)] 97.9 °F (36.6 °C) Temp src: Oral   BP: BP: (105-142)/(18-87) 110/60        Pulse: Heart Rate:  [] 82  RR: Resp:  [16-20] 18    General:  No acute distress   Abdomen: Fundus firm and beneath umbilicus   Pelvis: deferred     Lab Results   Component Value Date    WBC 11.24 (H) 04/02/2024    HGB 9.4 (L) 04/02/2024    HCT 28.9 (L) 04/02/2024    MCV 82.1 04/02/2024     04/02/2024    HEPBSAG Non-Reactive 12/12/2023    AST 13 03/31/2024    ALT 7 03/31/2024    URICACID 3.8 03/31/2024       Assessment  PPD# 1 after vaginal delivery  PP anemia - asymptomatic    Plan  Continue routine postpartum care, anticipate discharge in a.m.  Start PO iron      This note has been electronically signed.    Sammi Hauser MD  08:35 EDT  April 2, 2024

## 2024-04-02 NOTE — ANESTHESIA POSTPROCEDURE EVALUATION
Patient: Marely Mcgee    Procedure Summary       Date: 04/01/24 Room / Location:     Anesthesia Start: 0842 Anesthesia Stop: 0910    Procedure: LABOR ANALGESIA Diagnosis:     Scheduled Providers:  Provider: Mason Camacho DO    Anesthesia Type: epidural ASA Status: 2            Anesthesia Type: epidural    Vitals  Vitals Value Taken Time   /60 04/02/24 0735   Temp 97.9 °F (36.6 °C) 04/02/24 0735   Pulse 82 04/02/24 0735   Resp 18 04/02/24 0735   SpO2 99 % 04/01/24 0849   Vitals shown include unfiled device data.        Post Anesthesia Care and Evaluation    Patient location during evaluation: bedside  Patient participation: complete - patient participated  Level of consciousness: awake and alert  Pain management: adequate    Airway patency: patent  Anesthetic complications: No anesthetic complications    Cardiovascular status: acceptable  Respiratory status: acceptable  Hydration status: acceptable  Post Neuraxial Block status: Motor and sensory function returned to baseline and No signs or symptoms of PDPH

## 2024-04-03 VITALS
HEIGHT: 65 IN | DIASTOLIC BLOOD PRESSURE: 65 MMHG | RESPIRATION RATE: 18 BRPM | WEIGHT: 195 LBS | SYSTOLIC BLOOD PRESSURE: 107 MMHG | BODY MASS INDEX: 32.49 KG/M2 | HEART RATE: 85 BPM | TEMPERATURE: 97.8 F

## 2024-04-03 PROBLEM — Z34.90 TERM PREGNANCY: Status: RESOLVED | Noted: 2024-03-31 | Resolved: 2024-04-03

## 2024-04-03 RX ADMIN — DOCUSATE SODIUM 100 MG: 100 CAPSULE, LIQUID FILLED ORAL at 07:40

## 2024-04-03 RX ADMIN — PRENATAL VITAMINS-IRON FUMARATE 27 MG IRON-FOLIC ACID 0.8 MG TABLET 1 TABLET: at 07:40

## 2024-04-03 RX ADMIN — FERROUS SULFATE TAB 325 MG (65 MG ELEMENTAL FE) 325 MG: 325 (65 FE) TAB at 07:40

## 2024-04-03 RX ADMIN — IBUPROFEN 600 MG: 600 TABLET ORAL at 11:26

## 2024-04-03 NOTE — DISCHARGE SUMMARY
[place]  Vaginal Delivery Discharge Summary      Patient: Marely Mcgee      MR#:[id number]  Admission  Diagnosis: term pregnancy  Discharge Diagnosis: Same    Date of Admission: 3/31/2024  Date of Discharge:  4/3/2024    Procedures:  Vaginal, Spontaneous     4/1/2024    9:07 AM      Service:  Obstetrics    Hospital Course:  Patient underwent vaginal delivery and remained in the hospital for 3 days.  During that time she remained afebrile and hemodynamically stable.  On the day of discharge, she was eating, ambulating and voiding without difficulty.      Lab Results   Component Value Date    WBC 11.24 (H) 04/02/2024    HGB 9.4 (L) 04/02/2024    HCT 28.9 (L) 04/02/2024    MCV 82.1 04/02/2024     04/02/2024    CREATININE 0.61 03/31/2024    URICACID 3.8 03/31/2024    AST 13 03/31/2024    ALT 7 03/31/2024     (L) 03/31/2024     Results from last 7 days   Lab Units 03/31/24 2124   ABO TYPING  A   RH TYPING  Positive   ANTIBODY SCREEN  Negative       Discharge Medications     Discharge Medications        Continue These Medications        Instructions Start Date   ferrous sulfate 324 (65 Fe) MG tablet delayed-release EC tablet   324 mg, Oral, Daily With Breakfast      ibuprofen 600 MG tablet  Commonly known as: ADVIL,MOTRIN   600 mg, Oral, Every 6 Hours PRN      Prenatal 27-1 27-1 MG tablet tablet   1 tablet, Oral, Daily               Discharge Disposition:  To Home    Discharge Condition:  Stable    Discharge Diet: regular    Activity at Discharge: Pelvic rest    Follow-up Appointments  6 weeks      Daniella Escobar MD  04/03/24  08:59 EDT

## 2024-04-03 NOTE — PROGRESS NOTES
4/3/2024  PPD #2    Subjective   Marely feels well.  Patient describes her lochia as less than menses.  Pain is well controlled       Objective   Temp: Temp:  [97.8 °F (36.6 °C)-98.2 °F (36.8 °C)] 97.8 °F (36.6 °C) Temp src: Oral   BP: BP: (107-120)/(57-65) 107/65        Pulse: Heart Rate:  [82-90] 85  RR: Resp:  [16-18] 18    General:  No acute distress   Abdomen: Fundus firm and beneath umbilicus   Pelvis: deferred     Lab Results   Component Value Date    WBC 11.24 (H) 04/02/2024    HGB 9.4 (L) 04/02/2024    HCT 28.9 (L) 04/02/2024    MCV 82.1 04/02/2024     04/02/2024    HEPBSAG Non-Reactive 12/12/2023    AST 13 03/31/2024    ALT 7 03/31/2024    URICACID 3.8 03/31/2024       Assessment  PPD# 2 after vaginal delivery  PP anemia - asymptomatic.  Has po iron at home      Plan  Discharge to home  Follow up with LW  in 6 weeks  Advised no tampons, intercourse, or tub baths for 6 weeks.       This note has been electronically signed.    Daniella Escobar MD  08:58 EDT  April 3, 2024

## 2024-04-12 ENCOUNTER — MATERNAL SCREENING (OUTPATIENT)
Dept: CALL CENTER | Facility: HOSPITAL | Age: 29
End: 2024-04-12
Payer: MEDICAID

## 2024-04-12 NOTE — OUTREACH NOTE
Maternal Screening Survey      Flowsheet Row Responses   Facility patient discharged from? [place]   Attempt successful? No   Unsuccessful attempts Attempt 1              Doug BARRETO - Registered Nurse

## 2024-04-12 NOTE — OUTREACH NOTE
Maternal Screening Survey      Flowsheet Row Responses   Facility patient discharged from? [place]   Attempt successful? No   Unsuccessful attempts Attempt 2              Doug BARRETO - Registered Nurse

## 2024-04-14 ENCOUNTER — MATERNAL SCREENING (OUTPATIENT)
Dept: CALL CENTER | Facility: HOSPITAL | Age: 29
End: 2024-04-14
Payer: MEDICAID

## 2024-04-14 NOTE — OUTREACH NOTE
Maternal Screening Survey      Flowsheet Row Responses   Facility patient discharged from? [place]   Attempt successful? No   Unsuccessful attempts Attempt 3   Revoke Decline to participate              ANAID SHERMAN - Registered Nurse

## 2025-02-27 ENCOUNTER — LAB (OUTPATIENT)
Dept: LAB | Facility: HOSPITAL | Age: 30
End: 2025-02-27
Payer: MEDICAID

## 2025-02-27 ENCOUNTER — TRANSCRIBE ORDERS (OUTPATIENT)
Dept: LAB | Facility: HOSPITAL | Age: 30
End: 2025-02-27
Payer: MEDICAID

## 2025-02-27 DIAGNOSIS — R30.0 DYSURIA: Primary | ICD-10-CM

## 2025-02-27 DIAGNOSIS — R30.0 DYSURIA: ICD-10-CM

## 2025-02-27 LAB
BILIRUB UR QL STRIP: NEGATIVE
CLARITY UR: ABNORMAL
COLOR UR: ABNORMAL
GLUCOSE UR STRIP-MCNC: NEGATIVE MG/DL
HGB UR QL STRIP.AUTO: ABNORMAL
HOLD SPECIMEN: NORMAL
KETONES UR QL STRIP: NEGATIVE
LEUKOCYTE ESTERASE UR QL STRIP.AUTO: ABNORMAL
NITRITE UR QL STRIP: NEGATIVE
PH UR STRIP.AUTO: 7 [PH] (ref 5–8)
PROT UR QL STRIP: ABNORMAL
SP GR UR STRIP: 1.01 (ref 1–1.03)
UROBILINOGEN UR QL STRIP: ABNORMAL

## 2025-02-27 PROCEDURE — 87086 URINE CULTURE/COLONY COUNT: CPT

## 2025-02-27 PROCEDURE — 81001 URINALYSIS AUTO W/SCOPE: CPT

## 2025-02-27 PROCEDURE — 87186 SC STD MICRODIL/AGAR DIL: CPT

## 2025-02-27 PROCEDURE — 87077 CULTURE AEROBIC IDENTIFY: CPT

## 2025-02-28 LAB
BACTERIA UR QL AUTO: ABNORMAL /HPF
HYALINE CASTS UR QL AUTO: ABNORMAL /LPF
RBC # UR STRIP: ABNORMAL /HPF
REF LAB TEST METHOD: ABNORMAL
SQUAMOUS #/AREA URNS HPF: ABNORMAL /HPF
WBC # UR STRIP: ABNORMAL /HPF

## 2025-03-02 LAB — BACTERIA SPEC AEROBE CULT: ABNORMAL

## 2025-08-05 ENCOUNTER — TELEPHONE (OUTPATIENT)
Dept: CARDIOLOGY | Facility: CLINIC | Age: 30
End: 2025-08-05